# Patient Record
Sex: MALE | Race: WHITE | NOT HISPANIC OR LATINO | Employment: FULL TIME | URBAN - METROPOLITAN AREA
[De-identification: names, ages, dates, MRNs, and addresses within clinical notes are randomized per-mention and may not be internally consistent; named-entity substitution may affect disease eponyms.]

---

## 2018-03-04 ENCOUNTER — HOSPITAL ENCOUNTER (EMERGENCY)
Facility: CLINIC | Age: 43
Discharge: HOME | End: 2018-03-04
Attending: PHYSICIAN ASSISTANT | Admitting: PHYSICIAN ASSISTANT
Payer: OTHER MISCELLANEOUS

## 2018-03-04 ENCOUNTER — APPOINTMENT (OUTPATIENT)
Dept: CT IMAGING | Facility: CLINIC | Age: 43
End: 2018-03-04
Attending: PHYSICIAN ASSISTANT
Payer: OTHER MISCELLANEOUS

## 2018-03-04 VITALS
TEMPERATURE: 97 F | DIASTOLIC BLOOD PRESSURE: 74 MMHG | SYSTOLIC BLOOD PRESSURE: 109 MMHG | HEART RATE: 87 BPM | RESPIRATION RATE: 20 BRPM | OXYGEN SATURATION: 99 %

## 2018-03-04 DIAGNOSIS — S39.012A STRAIN OF LUMBAR REGION, INITIAL ENCOUNTER: ICD-10-CM
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
## 2018-03-04 PROCEDURE — 99284 EMERGENCY DEPT VISIT MOD MDM: CPT | Mod: 25 | Performed by: PHYSICIAN ASSISTANT

## 2018-03-04 PROCEDURE — 72131 CT LUMBAR SPINE W/O DYE: CPT

## 2018-03-04 PROCEDURE — 99284 EMERGENCY DEPT VISIT MOD MDM: CPT | Mod: Z6 | Performed by: PHYSICIAN ASSISTANT

## 2018-03-04 RX ORDER — CYCLOBENZAPRINE HCL 10 MG
10 TABLET ORAL 3 TIMES DAILY PRN
Qty: 20 TABLET | Refills: 0 | Status: SHIPPED | OUTPATIENT
Start: 2018-03-04 | End: 2018-03-10

## 2018-03-04 RX ORDER — METHYLPREDNISOLONE 4 MG
TABLET, DOSE PACK ORAL
Qty: 21 TABLET | Refills: 0 | Status: SHIPPED | OUTPATIENT
Start: 2018-03-04 | End: 2020-02-13

## 2018-03-04 ASSESSMENT — ENCOUNTER SYMPTOMS
BACK PAIN: 1
NUMBNESS: 0

## 2018-03-04 NOTE — LETTER
March 4, 2018      Navdeep Sands  [address] [address]        To Whom It May Concern:    Navdeep Sands  was seen on 3/4/2018.  Please excuse him from work for the next few days due to injury. He can return when feeling improved, but allow him to work at his own pace.        Sincerely,        Sandra Thorne PA-C

## 2018-03-04 NOTE — ED AVS SNAPSHOT
[facility]    [address]     [place] [address]    Phone:  [phone]    Fax:  [phone]                                       Navdeep Sands   MRN: [id number]    Department:  [facility]   Date of Visit:  3/4/2018           Patient Information     Date Of Birth          1975        Your diagnoses for this visit were:     Strain of lumbar region, initial encounter     Fall, initial encounter        You were seen by Sandra Thorne PA-C.      Follow-up Information     Follow up with [facility].    Specialty:  EMERGENCY MEDICINE    Why:  If symptoms worsen    Contact information:    [address]   [place] [address] [phone]    Additional information:    From [address] Exit at [address] on [place] side of [place]. Turn right on [address]. Turn left at stoplight on [address]. [facility] will be in view two blocks ahead        Follow up with Patricia Domingo PA-C In 5 days.    Specialty:  Physician Assistant    Why:  As needed for persistent symptoms    Contact information:    [address] [address]  [phone]          Discharge Instructions       Take the steroids as prescribed to treat your back pain. These can make you feel a bit jittery at times. Use the Flexeril as well as Tylenol for symptomatic relief. If you develop worsening symptoms as discussed please return to the emergency department.  If symptoms are not improved in 5 days, follow-up in the clinic with your primary care provider.    Thank you for choosing [facility]. It was a pleasure taking care of you today. If you have any questions, please call [phone].    Sandra Thorne PA-C      Back Sprain or Strain    Injury to the muscles (strain) or ligaments (sprain) around the spine can be troubling. Injury may occur after a sudden forceful  twisting or bending force such as in a car accident, after a simple awkward movement, or after lifting something heavy with poor body positioning. In any case, muscle spasm is often present and adds to the pain.  Thankfully, most people feel better in 1 to 2 weeks, and most of the rest in 1 to 2 months. Most people can remain active. Unless you had a forceful or traumatic physical injury such as a car accident or fall, X-rays may not be ordered for the first evaluation of a back sprain or strain. If pain continues and does not respond to medical treatment, your healthcare provider may then order X-rays and other tests.  Home care  The following guidelines will help you care for your injury at home:    When in bed, try to find a comfortable position. A firm mattress is best. Try lying flat on your back with pillows under your knees. You can also try lying on your side with your knees bent up toward your chest and a pillow between your knees.    Don't sit for long periods. Try not to take long car rides or take other trips that have you sitting for a long time. This puts more stress on the lower back than standing or walking.    During the first 24 to 72 hours after an injury or flare-up, apply an ice pack to the painful area for 20 minutes. Then remove it for 20 minutes. Do this for 60 to 90 minutes, or several times a day. This will reduce swelling and pain. Be sure to wrap the ice pack in a thin towel or plastic to protect your skin.    You can start with ice, then switch to heat. Heat from a hot shower, hot bath, or heating pad reduces pain and works well for muscle spasms. Put heat on the painful area for 20 minutes, then remove for 20 minutes. Do this for 60 to 90 minutes, or several times a day. Do not use a heating pad while sleeping. It can burn the skin.    You can alternate the ice and heat. Talk with your healthcare provider to find out the best treatment or therapy for your back pain.    Therapeutic  massage will help relax the back muscles without stretching them.    Be aware of safe lifting methods. Do not lift anything over 15 pounds until all of the pain is gone.  Medicines  Talk to your healthcare provider before using medicines, especially if you have other health problems or are taking other medicines.    You may use acetaminophen or ibuprofen to control pain, unless another pain medicine was prescribed. If you have chronic conditions like diabetes, liver or kidney disease, stomach ulcers, or gastrointestinal bleeding, or are taking blood-thinner medicines, talk with your doctor before taking any medicines.    Be careful if you are given prescription medicines, narcotics, or medicine for muscle spasm. They can cause drowsiness, and affect your coordination, reflexes, and judgment. Do not drive or operate heavy machinery when taking these types of medicines. Only take pain medicine as prescribed by your healthcare provider.  Follow-up care  Follow up with your healthcare provider, or as advised. You may need physical therapy or more tests if your symptoms get worse.  If you had X-rays your healthcare provider may be checking for any broken bones, breaks, or fractures. Bruises and sprains can sometimes hurt as much as a fracture. These injuries can take time to heal completely. If your symptoms don t improve or they get worse, talk with your healthcare provider. You may need a repeat X-ray or other tests.  Call 911  Call for emergency care if any of the following occur:    Trouble breathing    Confused    Very drowsy or trouble awakening    Fainting or loss of consciousness    Rapid or very slow heart rate    Loss of bowel or bladder control  When to seek medical advice  Call your healthcare provider right away if any of the following occur:    Pain gets worse or spreads to your arms or legs    Weakness or numbness in one or both arms or legs    Numbness in the groin or genital area        24 Hour  Appointment Hotline       To make an appointment at any [facility], call [phone] ([phone]). If you don't have a family doctor or clinic, we will help you find one. [facility] are conveniently located to serve the needs of you and your family.             Review of your medicines      START taking        Dose / Directions Last dose taken    cyclobenzaprine 10 MG tablet   Commonly known as:  FLEXERIL   Dose:  10 mg   Quantity:  20 tablet        Take 1 tablet (10 mg) by mouth 3 times daily as needed for muscle spasms   Refills:  0        methylPREDNISolone 4 MG tablet   Commonly known as:  MEDROL DOSEPAK   Quantity:  21 tablet        Follow package instructions   Refills:  0          Our records show that you are taking the medicines listed below. If these are incorrect, please call your family doctor or clinic.        Dose / Directions Last dose taken    ibuprofen 800 MG tablet   Commonly known as:  ADVIL/MOTRIN   Dose:  800 mg   Quantity:  60 tablet        Take 1 tablet (800 mg) by mouth every 8 hours as needed for moderate pain   Refills:  0                Prescriptions were sent or printed at these locations (2 Prescriptions)                   [facility]   [address] [place] [address]    Telephone:  [phone]   Fax:  [phone]   Hours:                  Printed at Department/Unit printer (2 of 2)         methylPREDNISolone (MEDROL DOSEPAK) 4 MG tablet               cyclobenzaprine (FLEXERIL) 10 MG tablet                Procedures and tests performed during your visit     CT Lumbar spine w/o contrast*      Orders Needing Specimen Collection     None      Pending Results     No orders found from 3/2/2018 to 3/5/2018.            Pending Culture Results     No orders found from 3/2/2018 to 3/5/2018.            Pending Results Instructions     If you had any lab results that were not finalized at the time of your Discharge, you can call the ED Lab Result RN at  [phone]. You will be contacted by this team for any positive Lab results or changes in treatment. The nurses are available 7 days a week from 10A to 6:30P.  You can leave a message 24 hours per day and they will return your call.        Thank you for choosing [facility]       Thank you for choosing [facility] for your care. Our goal is always to provide you with excellent care. Hearing back from our patients is one way we can continue to improve our services. Please take a few minutes to complete the written survey that you may receive in the mail after you visit with us. Thank you!        [employer] Information     [facility] gives you secure access to your electronic health record. If you see a primary care provider, you can also send messages to your care team and make appointments. If you have questions, please call your primary care clinic.  If you do not have a primary care provider, please call [phone] and they will assist you.        Care EveryWhere ID     This is your Care EveryWhere ID. This could be used by other organizations to access your [facility] medical records  [id number]        Equal Access to Services     EMMA ROCHE : Hadmirna Mcknight, wamagda blair, qaemma sanders, calli lomeli . So [facility] [phone].    ATENCIÓN: Si habla español, tiene a szymanski disposición servicios gratuitos de asistencia lingüística. Llame al [phone].    We comply with applicable federal civil rights laws and [place] laws. We do not discriminate on the basis of race, color, national origin, age, disability, sex, sexual orientation, or gender identity.            After Visit Summary       This is your record. Keep this with you and show to your community pharmacist(s) and doctor(s) at your next visit.

## 2018-03-04 NOTE — ED AVS SNAPSHOT
[facility] Emergency Department    [address] [place] [address]    Phone:  [phone]    Fax:  [phone]                                       Navdeep Sands   MRN: [id number]    Department:  [facility] Emergency Department   Date of Visit:  3/4/2018           After Visit Summary Signature Page     I have received my discharge instructions, and my questions have been answered. I have discussed any challenges I see with this plan with the nurse or doctor.    ..........................................................................................................................................  Patient/Patient Representative Signature      ..........................................................................................................................................  Patient Representative Print Name and Relationship to Patient    ..................................................               ................................................  Date                                            Time    ..........................................................................................................................................  Reviewed by Signature/Title    ...................................................              ..............................................  Date                                                            Time

## 2018-03-05 ASSESSMENT — ENCOUNTER SYMPTOMS
FEVER: 0
WOUND: 0
VOMITING: 0
NAUSEA: 0
WEAKNESS: 0

## 2018-03-05 NOTE — ED PROVIDER NOTES
History     Chief Complaint   Patient presents with     Back Pain     HPI  Navdeep Sands is a 42 year old male who presents to the ED for evaluation of back pain. Patient was walking down the steps 5 days ago and slipped on the last step causing him to land on his back. He had his wife look at him and she noted that his right knee and right lower back were bruised. He has been using Flexeril and applying ice and heat with mild relief. He has tried looking up different exercises online to help with the pain. He also took 600 mg of Ibuprofen. He mentions that when he was stretching he felt the pain got much worse. He thinks that he over exerted himself. He denies numbness or tingling down the legs. No bowel or bladder incontinence.  Denies any other injuries.  Main concern is back, knee is not bothering him.        Problem List:    Patient Active Problem List    Diagnosis Date Noted     Obesity, Class I, BMI 30-34.9 10/22/2015     Priority: Medium     CARDIOVASCULAR SCREENING; LDL GOAL LESS THAN 160 03/11/2014     Priority: Medium        Past Medical History:    No past medical history on file.    Past Surgical History:    No past surgical history on file.    Family History:    Family History   Problem Relation Age of Onset     CANCER Mother      liver cancer and skin cancer     Hypertension Maternal Grandfather      Asthma No family hx of      C.A.D. No family hx of      DIABETES No family hx of      CEREBROVASCULAR DISEASE No family hx of      Breast Cancer No family hx of      Cancer - colorectal No family hx of      Hypertension Maternal Uncle        Social History:  Marital Status:  Single [1]  Social History   Substance Use Topics     Smoking status: Current Every Day Smoker     Packs/day: 0.50     Years: 22.00     Types: Cigarettes     Smokeless tobacco: Never Used     Alcohol use No        Medications:      methylPREDNISolone (MEDROL DOSEPAK) 4 MG tablet   cyclobenzaprine (FLEXERIL) 10 MG tablet   ibuprofen  (ADVIL,MOTRIN) 800 MG tablet       Review of Systems   Constitutional: Negative for fever.   Gastrointestinal: Negative for nausea and vomiting.   Musculoskeletal: Positive for back pain.   Skin: Negative for wound.   Neurological: Negative for weakness and numbness.   All other systems reviewed and are negative.    Physical Exam   BP: 121/80  Pulse: 80  Temp: 97  F (36.1  C)  Resp: 20  SpO2: 99 %    Physical Exam   Constitutional: He is oriented to person, place, and time. He appears well-developed and well-nourished. No distress.   HENT:   Head: Normocephalic and atraumatic.   Mouth/Throat: Uvula is midline, oropharynx is clear and moist and mucous membranes are normal. No oral lesions. No oropharyngeal exudate, posterior oropharyngeal edema or posterior oropharyngeal erythema.   Eyes: Conjunctivae are normal. No scleral icterus.   Neck: Normal range of motion. Neck supple.   Cardiovascular: Intact distal pulses.    Pulmonary/Chest: Effort normal. No respiratory distress.   Musculoskeletal: He exhibits no edema.        Cervical back: Normal.        Thoracic back: Normal.   Mid low lumbar midline tenderness, right paraspinal spasms and tenderness to palpation  Bilateral lower extremities with normal strength, normal sensation, and strong distal pulses.   Lymphadenopathy:     He has no cervical adenopathy.   Neurological: He is alert and oriented to person, place, and time.   Skin: Skin is warm and dry. He is not diaphoretic. No pallor.   Psychiatric: He has a normal mood and affect. His behavior is normal.   Nursing note and vitals reviewed.    ED Course     ED Course     Procedures      Results for orders placed or performed during the hospital encounter of 03/04/18 (from the past 24 hour(s))   CT Lumbar spine w/o contrast*    Narrative    CT LUMBAR SPINE WITHOUT CONTRAST  3/4/2018 9:37 PM     HISTORY: Pain in low back after fall.       TECHNIQUE: Axial images of the lumbar spine were obtained without  intravenous  contrast. Multiplanar reformations were performed.   Radiation dose for this scan was reduced using automated exposure  control, adjustment of the mA and/or kV according to patient size, or  iterative reconstruction technique.    COMPARISON: None.    FINDINGS:  There are 5 lumbar-type vertebral bodies assumed for the  purposes of this dictation.     Lumbar spine alignment is within normal limits. No loss of vertebral  body height. No evidence of fracture. There is mild loss of  intervertebral disc space and degenerative endplate changes at L3-4.    Level by level as follows:    T12-L1:  No significant spinal canal or neural foraminal narrowing.     L1-L2:  No significant spinal canal or neural foraminal narrowing.     L2-L3:  No significant spinal canal or neural foraminal narrowing.     L3-L4:  Small posterior disc bulge and bilateral facet hypertrophy  results in mild spinal canal narrowing. No significant neural  foraminal narrowing.     L4-L5:  Small posterior disc bulge along with bilateral facet  hypertrophy results in mild spinal canal narrowing. No significant  neural foraminal narrowing.     L5-S1:  Small posterior disc bulge without significant spinal canal or  neural foraminal narrowing.     Visualized paraspinous tissues: Unremarkable.      Impression    IMPRESSION:  No acute fracture or subluxation in the lumbar spine.  Degenerative changes as described above.       ANGELA FIELDS MD       Medications - No data to display    Assessments & Plan (with Medical Decision Making)  Navdeep Sands is a 42 year old male who presented to the ED with back pain that developed several days ago after he slipped and fell onto his back on ice.  He reports pain to the mid low back into the right hip.  Vitals on arrival unremarkable.  He exhibited low midline lumbar tenderness and right paraspinal muscle tenderness with spasm.  He had full strength in bilateral lower extremities, no numbness, and no loss of bowel or  bladder control.  Patient was offered pain medications here but declined.  I have low suspicion for acute neurological injury at this time given these findings, so no MRI indicated.  There was concern for fracture of the lumbar back so CT scan obtained.  This was fortunately negative for acute fracture or subluxation in the lumbar spine.  Patient likely contused the back and sustained a mild strain.  He was agreeable to trying a Medrol Dosepak and continued use of Flexeril for symptomatic relief, I did go over side effects of both medications with him.  I advised no ibuprofen while taking steroids but he can take Tylenol.  He was advised to continue icing the low back and practice gentle stretching.  I did go over warning signs and symptoms of when to return to the ED, otherwise he should follow-up with his PCP in 5-7 days if no improvement in symptoms.  Patient in agreement with plan and discharged home.     I have reviewed the nursing notes.    I have reviewed the findings, diagnosis, plan and need for follow up with the patient.    Discharge Medication List as of 3/4/2018 10:19 PM      START taking these medications    Details   methylPREDNISolone (MEDROL DOSEPAK) 4 MG tablet Follow package instructions, Disp-21 tablet, R-0, Local Print      cyclobenzaprine (FLEXERIL) 10 MG tablet Take 1 tablet (10 mg) by mouth 3 times daily as needed for muscle spasms, Disp-20 tablet, R-0, Local Print             Final diagnoses:   Strain of lumbar region, initial encounter   Fall, initial encounter     This document serves as a record of services personally performed by Sandra Thorne PA-C. It was created on their behalf by Tamela Madera, a trained medical scribe. The creation of this record is based on the provider's personal observations and the statements of the patient. This document has been checked and approved by the attending provider.    Note: Chart documentation done in part with Dragon Voice Recognition  software. Although reviewed after completion, some word and grammatical errors may remain.      3/4/2018   [facility] EMERGENCY DEPARTMENT     Sandra Thorne PA-C  03/05/18 0005

## 2018-03-05 NOTE — DISCHARGE INSTRUCTIONS
Take the steroids as prescribed to treat your back pain. These can make you feel a bit jittery at times. Use the Flexeril as well as Tylenol for symptomatic relief. If you develop worsening symptoms as discussed please return to the emergency department.  If symptoms are not improved in 5 days, follow-up in the clinic with your primary care provider.    Thank you for choosing [facility] Emergency Department. It was a pleasure taking care of you today. If you have any questions, please call [phone].    Sandra Thorne PA-C      Back Sprain or Strain    Injury to the muscles (strain) or ligaments (sprain) around the spine can be troubling. Injury may occur after a sudden forceful twisting or bending force such as in a car accident, after a simple awkward movement, or after lifting something heavy with poor body positioning. In any case, muscle spasm is often present and adds to the pain.  Thankfully, most people feel better in 1 to 2 weeks, and most of the rest in 1 to 2 months. Most people can remain active. Unless you had a forceful or traumatic physical injury such as a car accident or fall, X-rays may not be ordered for the first evaluation of a back sprain or strain. If pain continues and does not respond to medical treatment, your healthcare provider may then order X-rays and other tests.  Home care  The following guidelines will help you care for your injury at home:    When in bed, try to find a comfortable position. A firm mattress is best. Try lying flat on your back with pillows under your knees. You can also try lying on your side with your knees bent up toward your chest and a pillow between your knees.    Don't sit for long periods. Try not to take long car rides or take other trips that have you sitting for a long time. This puts more stress on the lower back than standing or walking.    During the first 24 to 72 hours after an injury or flare-up, apply an ice pack to the painful area  for 20 minutes. Then remove it for 20 minutes. Do this for 60 to 90 minutes, or several times a day. This will reduce swelling and pain. Be sure to wrap the ice pack in a thin towel or plastic to protect your skin.    You can start with ice, then switch to heat. Heat from a hot shower, hot bath, or heating pad reduces pain and works well for muscle spasms. Put heat on the painful area for 20 minutes, then remove for 20 minutes. Do this for 60 to 90 minutes, or several times a day. Do not use a heating pad while sleeping. It can burn the skin.    You can alternate the ice and heat. Talk with your healthcare provider to find out the best treatment or therapy for your back pain.    Therapeutic massage will help relax the back muscles without stretching them.    Be aware of safe lifting methods. Do not lift anything over 15 pounds until all of the pain is gone.  Medicines  Talk to your healthcare provider before using medicines, especially if you have other health problems or are taking other medicines.    You may use acetaminophen or ibuprofen to control pain, unless another pain medicine was prescribed. If you have chronic conditions like diabetes, liver or kidney disease, stomach ulcers, or gastrointestinal bleeding, or are taking blood-thinner medicines, talk with your doctor before taking any medicines.    Be careful if you are given prescription medicines, narcotics, or medicine for muscle spasm. They can cause drowsiness, and affect your coordination, reflexes, and judgment. Do not drive or operate heavy machinery when taking these types of medicines. Only take pain medicine as prescribed by your healthcare provider.  Follow-up care  Follow up with your healthcare provider, or as advised. You may need physical therapy or more tests if your symptoms get worse.  If you had X-rays your healthcare provider may be checking for any broken bones, breaks, or fractures. Bruises and sprains can sometimes hurt as much as a  fracture. These injuries can take time to heal completely. If your symptoms don t improve or they get worse, talk with your healthcare provider. You may need a repeat X-ray or other tests.  Call 911  Call for emergency care if any of the following occur:    Trouble breathing    Confused    Very drowsy or trouble awakening    Fainting or loss of consciousness    Rapid or very slow heart rate    Loss of bowel or bladder control  When to seek medical advice  Call your healthcare provider right away if any of the following occur:    Pain gets worse or spreads to your arms or legs    Weakness or numbness in one or both arms or legs    Numbness in the groin or genital area

## 2020-02-04 ENCOUNTER — OFFICE VISIT (OUTPATIENT)
Dept: DERMATOLOGY | Facility: CLINIC | Age: 45
End: 2020-02-04
Payer: MEDICAID

## 2020-02-04 ENCOUNTER — TELEPHONE (OUTPATIENT)
Dept: DERMATOLOGY | Facility: CLINIC | Age: 45
End: 2020-02-04

## 2020-02-04 DIAGNOSIS — R21 RASH: ICD-10-CM

## 2020-02-04 DIAGNOSIS — D48.5 NEOPLASM OF UNCERTAIN BEHAVIOR OF SKIN: Primary | ICD-10-CM

## 2020-02-04 PROCEDURE — 88305 TISSUE EXAM BY PATHOLOGIST: CPT | Mod: TC | Performed by: DERMATOLOGY

## 2020-02-04 PROCEDURE — 99213 OFFICE O/P EST LOW 20 MIN: CPT | Mod: 25 | Performed by: DERMATOLOGY

## 2020-02-04 PROCEDURE — 11102 TANGNTL BX SKIN SINGLE LES: CPT | Performed by: DERMATOLOGY

## 2020-02-04 RX ORDER — PERMETHRIN 50 MG/G
CREAM TOPICAL
Qty: 120 G | Refills: 1 | Status: SHIPPED | OUTPATIENT
Start: 2020-02-04 | End: 2020-02-13

## 2020-02-04 RX ORDER — TRIAMCINOLONE ACETONIDE 1 MG/G
CREAM TOPICAL
Qty: 454 G | Refills: 0 | Status: SHIPPED | OUTPATIENT
Start: 2020-02-04 | End: 2020-02-13

## 2020-02-04 ASSESSMENT — PAIN SCALES - GENERAL: PAINLEVEL: NO PAIN (0)

## 2020-02-04 NOTE — PATIENT INSTRUCTIONS
*if you have any concerns questions please call us at [phone]*      Wound Care After a Biopsy    What is a skin biopsy?  A skin biopsy allows the doctor to examine a very small piece of tissue under the microscope to determine the diagnosis and the best treatment for the skin condition. A local anesthetic (numbing medicine)  is injected with a very small needle into the skin area to be tested. A small piece of skin is taken from the area. Sometimes a suture (stitch) is used.     What are the risks of a skin biopsy?  I will experience scar, bleeding, swelling, pain, crusting and redness. I may experience incomplete removal or recurrence. Risks of this procedure are excessive bleeding, bruising, infection, nerve damage, numbness, thick (hypertrophic or keloidal) scar and non-diagnostic biopsy.    How should I care for my wound for the first 24 hours?    Keep the wound dry and covered for 24 hours    If it bleeds, hold direct pressure on the area for 15 minutes. If bleeding does not stop then go to the emergency room    Avoid strenuous exercise the first 1-2 days or as your doctor instructs you    How should I care for the wound after 24 hours?    After 24 hours, remove the bandage    You may bathe or shower as normal    If you had a scalp biopsy, you can shampoo as usual and can use shower water to clean the biopsy site daily    Clean the wound twice a day with gentle soap and water    Do not scrub, be gentle    Apply white petroleum/Vaseline after cleaning the wound with a cotton swab or a clean finger, and keep the site covered with a Bandaid /bandage. Bandages are not necessary with a scalp biopsy    If you are unable to cover the site with a Bandaid /bandage, re-apply ointment 2-3 times a day to keep the site moist. Moisture will help with healing    Avoid strenuous activity for first 1-2 days    Avoid lakes, rivers, pools, and oceans until the stitches are removed or the site is healed    How do I clean my  wound?    Wash hands thoroughly with soap or use hand  before all wound care    Clean the wound with gentle soap and water    Apply white petroleum/Vaseline  to wound after it is clean    Replace the Bandaid /bandage to keep the wound covered for the first few days or as instructed by your doctor    If you had a scalp biopsy, warm shower water to the area on a daily basis should suffice    What should I use to clean my wound?     Cotton-tipped applicators (Qtips )    White petroleum jelly (Vaseline ). Use a clean new container and use Q-tips to apply.    Bandaids   as needed    Gentle soap     How should I care for my wound long term?    Do not get your wound dirty    Keep up with wound care for one week or until the area is healed.    A small scab will form and fall off by itself when the area is completely healed. The area will be red and will become pink in color as it heals. Sun protection is very important for how your scar will turn out. Sunscreen with an SPF 30 or greater is recommended once the area is healed.    You should have some soreness but it should be mild and slowly go away over several days. Talk to your doctor about using tylenol for pain,    When should I call my doctor?  If you have increased:     Pain or swelling    Pus or drainage (clear or slightly yellow drainage is ok)    Temperature over 100F    Spreading redness or warmth around wound    When will I hear about my results?  The biopsy results can take 2-3 weeks to come back. The clinic will call you with the results, send you a [employer] message, or have you schedule a follow-up clinic or phone time to discuss the results. Contact our clinics if you do not hear from us in 3 weeks.     Who should I call with questions?    [facility]: [phone]     [facility]: [phone]    For urgent needs outside of business hours call the [facility] at [phone] and ask  for the dermatology resident on call      [email or web address]  [email or web address]

## 2020-02-04 NOTE — LETTER
2/4/2020         RE: Navdeep Sands  [address] [id number]        Dear Colleague,    Thank you for referring your patient, Navdeep Sands, to the [facility]. Please see a copy of my visit note below.    [facility] Dermatology Note      Dermatology Problem List:  1. Erythematous papules and plaques- likely scabies  -premethrin, triamcinolone, vanicream    2.  NUB, right lateral elbow 6 mm verrucous papule,  -s/p shave biopsy 02/04/20    3. 2 cm subcutaneous nodule beneath left cheek    Encounter Date: Feb 4, 2020    CC:  Chief Complaint   Patient presents with     Rash     all over- itchy has used medrol dose rodolfo completed helped but once stopped all came back         History of Present Illness:  Mr. Navdeep Sands is a 44 year old male who presents as a self-referral for a rash. He has not been seen by dermatology before. Today he reports the rash started 10 days ago. He then used a Medrol Dose Pack, which helped, but once he stopped the rash returned. It currently itches. He reports it is all over his body. He has not tried topical medications. Does not feel sick. Changed laundry soap and new cologne. No one at home has rash - has two adult kids that live with him. Friend with scabies stayed at patient's home for 1 night three months ago.     Patient also reports spot of concern on left cheek. Has been present for 2 years. Patient smokes cigarettes. No pain.    Patient has hip surgery 1 week from tomorrow.     Pt has wart like spot on arm he wants removed.     No other concerns.    Past Medical History:   Patient Active Problem List   Diagnosis     CARDIOVASCULAR SCREENING; LDL GOAL LESS THAN 160     Obesity, Class I, BMI 30-34.9     No past medical history on file.  No past surgical history on file.    Social History:  Patient reports that he has been smoking cigarettes. He has a 11.00 pack-year smoking history. He has never used smokeless tobacco. He  reports that he does not drink alcohol or use drugs. Not . Has two adult children that live with him. Has girlfriend.     Family History:  Family History   Problem Relation Age of Onset     Cancer Mother         liver cancer and skin cancer     Hypertension Maternal Grandfather      Asthma No family hx of      C.A.D. No family hx of      Diabetes No family hx of      Cerebrovascular Disease No family hx of      Breast Cancer No family hx of      Cancer - colorectal No family hx of      Hypertension Maternal Uncle        Medications:  Current Outpatient Medications   Medication Sig Dispense Refill     ibuprofen (ADVIL,MOTRIN) 800 MG tablet Take 1 tablet (800 mg) by mouth every 8 hours as needed for moderate pain 60 tablet 0     methylPREDNISolone (MEDROL DOSEPAK) 4 MG tablet Follow package instructions (Patient not taking: Reported on 2/4/2020) 21 tablet 0       Allergies   Allergen Reactions     Nickel Other (See Comments)     convulsions     Imitrex [Sumatriptan]      Eye pain, stomach pain, headache       Review of Systems:  -Constitutional: Otherwise feeling well today, in usual state of health.  -HEENT: Patient denies nonhealing oral sores.  -Skin: As above in HPI. No additional skin concerns.    Physical exam:  Vitals: There were no vitals taken for this visit.  GEN: This is a well developed, well-nourished male in no acute distress, in a pleasant mood.    SKIN: Full skin, which includes the head/face, both arms, chest, back, abdomen,both legs, genitalia and/or groin buttocks, digits and/or nails, was examined.  -palms clear  -Erythematous papules and plaques on trunk/extremeties, groin, 1 lesion on wrist  - 6 mm verrucous papule, right lateral elbow  -2 cm subcutaneous nodule beneath left cheek  -No other lesions of concern on areas examined.       Impression/Plan:  1. NUB, right lateral elbow 6 mm verrucous papule, Ddx: wart vs other  -Shave biopsy:  After discussion of benefits and risks including but  not limited to bleeding/bruising, pain/swelling, infection, scar, incomplete removal, nerve damage/numbness, recurrence, and non-diagnostic biopsy, written consent, verbal consent and photographs were obtained. Time-out was performed. The area was cleaned with isopropyl alcohol. 0.5mL of 1% lidocaine with epinephrine was injected to obtain adequate anesthesia of the lesion on the right lateral elbow. A shave biopsy was performed. Hemostasis was achieved with aluminium chloride. Vaseline and a sterile dressing were applied. The patient tolerated the procedure and no complications were noted. The patient was provided with verbal and written post care instructions.       2. Erythematous papules and plaques on trunk/extremeties, groin, 1 lesion on wrist - likely scabies due to exposure and clinical appearance. Will treat and if not improved then biopsy.   -Mineral oil scarping completed. Evidence of mite not visualized after 2 attempts.  -The patient was counseled on the infectious and contagious nature of this disease.Start permethrin 5% cream to whole body from neck down (including under fingernails, genitals, feet and hands) overnight, wash off in morning, repeat in 1 week. All close contacts should also see their physician for evaluation and treatment.  Wash clothes, bedding in hot water after treatments.Wash all clothing and bed linens used in last 72 hours in hot soapy water.  Anything that cannot be washed should be placed in a plastic bag and not worn for 72 hours. Nursing was notified so as to conform to hospital protocol. Between treatments, start emollient twice daily from the neck down.   When not using permethrin, may use triamcinolone 0.1% cream twice daily for 2 weeks on the trunk, arms and legs. Discussed this is not for face/groin/axilla or folds.Discussed risk of steroid atrophy with overuse. I counseled that the rash and itch will not resolve immediately and that sometimes the rash persists for weeks  after treatment.     3. 2 cm subcutaneous nodule beneath left cheek - likely cyst, order ultrasound to rule out other tumor. Pt is smoker.   -Ultrasound ordered.     Follow-up in 4 weeks after hip surgery for rash follow up, earlier for new or changing lesions.       Staff Involved:  Scribe/Staff      Scribe Disclosure  I, Travis Joseph, am serving as a scribe to document services personally performed by Dr. Brigid Pierce MD, based on data collection and the provider's statements to me.    Provider Disclosure:   The documentation recorded by the scribe accurately reflects the services I personally performed and the decisions made by me.    Brigid Pierce MD    Department of Dermatology  [facility]: Phone: [phone], Fax:[phone]  [facility]: Phone: [phone], Fax: [phone]              Again, thank you for allowing me to participate in the care of your patient.        Sincerely,        Brigid Pierce MD

## 2020-02-04 NOTE — NURSING NOTE
Navdeep Sands's goals for this visit include:   Chief Complaint   Patient presents with     Rash     all over- itchy has used medrol dose rodolfo completed helped but once stopped all came back       He requests these members of his care team be copied on today's visit information:     PCP: Patricia Domingo    Referring Provider:  No referring provider defined for this encounter.    There were no vitals taken for this visit.    Do you need any medication refills at today's visit? Eboni Fung LPN

## 2020-02-04 NOTE — PROGRESS NOTES
[facility] Dermatology Note      Dermatology Problem List:  1. Erythematous papules and plaques- likely scabies  -premethrin, triamcinolone, vanicream    2.  NUB, right lateral elbow 6 mm verrucous papule,  -s/p shave biopsy 02/04/20    3. 2 cm subcutaneous nodule beneath left cheek    Encounter Date: Feb 4, 2020    CC:  Chief Complaint   Patient presents with     Rash     all over- itchy has used medrol dose rodolfo completed helped but once stopped all came back         History of Present Illness:  Mr. Navdeep Sands is a 44 year old male who presents as a self-referral for a rash. He has not been seen by dermatology before. Today he reports the rash started 10 days ago. He then used a Medrol Dose Pack, which helped, but once he stopped the rash returned. It currently itches. He reports it is all over his body. He has not tried topical medications. Does not feel sick. Changed laundry soap and new cologne. No one at home has rash - has two adult kids that live with him. Friend with scabies stayed at patient's home for 1 night three months ago.     Patient also reports spot of concern on left cheek. Has been present for 2 years. Patient smokes cigarettes. No pain.    Patient has hip surgery 1 week from tomorrow.     Pt has wart like spot on arm he wants removed.     No other concerns.    Past Medical History:   Patient Active Problem List   Diagnosis     CARDIOVASCULAR SCREENING; LDL GOAL LESS THAN 160     Obesity, Class I, BMI 30-34.9     No past medical history on file.  No past surgical history on file.    Social History:  Patient reports that he has been smoking cigarettes. He has a 11.00 pack-year smoking history. He has never used smokeless tobacco. He reports that he does not drink alcohol or use drugs. Not . Has two adult children that live with him. Has girlfriend.     Family History:  Family History   Problem Relation Age of Onset     Cancer Mother         liver cancer and skin cancer      Hypertension Maternal Grandfather      Asthma No family hx of      C.A.D. No family hx of      Diabetes No family hx of      Cerebrovascular Disease No family hx of      Breast Cancer No family hx of      Cancer - colorectal No family hx of      Hypertension Maternal Uncle        Medications:  Current Outpatient Medications   Medication Sig Dispense Refill     ibuprofen (ADVIL,MOTRIN) 800 MG tablet Take 1 tablet (800 mg) by mouth every 8 hours as needed for moderate pain 60 tablet 0     methylPREDNISolone (MEDROL DOSEPAK) 4 MG tablet Follow package instructions (Patient not taking: Reported on 2/4/2020) 21 tablet 0       Allergies   Allergen Reactions     Nickel Other (See Comments)     convulsions     Imitrex [Sumatriptan]      Eye pain, stomach pain, headache       Review of Systems:  -Constitutional: Otherwise feeling well today, in usual state of health.  -HEENT: Patient denies nonhealing oral sores.  -Skin: As above in HPI. No additional skin concerns.    Physical exam:  Vitals: There were no vitals taken for this visit.  GEN: This is a well developed, well-nourished male in no acute distress, in a pleasant mood.    SKIN: Full skin, which includes the head/face, both arms, chest, back, abdomen,both legs, genitalia and/or groin buttocks, digits and/or nails, was examined.  -palms clear  -Erythematous papules and plaques on trunk/extremeties, groin, 1 lesion on wrist  - 6 mm verrucous papule, right lateral elbow  -2 cm subcutaneous nodule beneath left cheek  -No other lesions of concern on areas examined.       Impression/Plan:  1. NUB, right lateral elbow 6 mm verrucous papule, Ddx: wart vs other  -Shave biopsy:  After discussion of benefits and risks including but not limited to bleeding/bruising, pain/swelling, infection, scar, incomplete removal, nerve damage/numbness, recurrence, and non-diagnostic biopsy, written consent, verbal consent and photographs were obtained. Time-out was performed. The area was  cleaned with isopropyl alcohol. 0.5mL of 1% lidocaine with epinephrine was injected to obtain adequate anesthesia of the lesion on the right lateral elbow. A shave biopsy was performed. Hemostasis was achieved with aluminium chloride. Vaseline and a sterile dressing were applied. The patient tolerated the procedure and no complications were noted. The patient was provided with verbal and written post care instructions.       2. Erythematous papules and plaques on trunk/extremeties, groin, 1 lesion on wrist - likely scabies due to exposure and clinical appearance. Will treat and if not improved then biopsy.   -Mineral oil scarping completed. Evidence of mite not visualized after 2 attempts.  -The patient was counseled on the infectious and contagious nature of this disease.Start permethrin 5% cream to whole body from neck down (including under fingernails, genitals, feet and hands) overnight, wash off in morning, repeat in 1 week. All close contacts should also see their physician for evaluation and treatment.  Wash clothes, bedding in hot water after treatments.Wash all clothing and bed linens used in last 72 hours in hot soapy water.  Anything that cannot be washed should be placed in a plastic bag and not worn for 72 hours. Nursing was notified so as to conform to hospital protocol. Between treatments, start emollient twice daily from the neck down.   When not using permethrin, may use triamcinolone 0.1% cream twice daily for 2 weeks on the trunk, arms and legs. Discussed this is not for face/groin/axilla or folds.Discussed risk of steroid atrophy with overuse. I counseled that the rash and itch will not resolve immediately and that sometimes the rash persists for weeks after treatment.     3. 2 cm subcutaneous nodule beneath left cheek - likely cyst, order ultrasound to rule out other tumor. Pt is smoker.   -Ultrasound ordered.     Follow-up in 4 weeks after hip surgery for rash follow up, earlier for new or  changing lesions.       Staff Involved:  Scribe/Staff      Scribe Disclosure  I, Travis Joseph, am serving as a scribe to document services personally performed by Dr. Brigid Pierce MD, based on data collection and the provider's statements to me.    Provider Disclosure:   The documentation recorded by the scribe accurately reflects the services I personally performed and the decisions made by me.    Brigid Pierce MD    Department of Dermatology  [facility]: Phone: [phone], Fax:[phone]  [facility]: Phone: [phone], Fax: [phone]

## 2020-02-04 NOTE — TELEPHONE ENCOUNTER
[facility] Call Center    Phone Message    May a detailed message be left on voicemail: yes     Reason for Call: Medication Question or concern regarding medication   Prescription Clarification  Name of Medication: triamcinolone (KENALOG) 0.1 % external cream [94685] (Order [id number])     Prescribing Provider: Kari   Pharmacy:      [facility] [place] [address]       What on the order needs clarification? They need to know where on the body it is being applied          Action Taken: Message routed to:  Adult Clinics: Dermatology p [phone]

## 2020-02-05 ENCOUNTER — HOSPITAL ENCOUNTER (OUTPATIENT)
Dept: ULTRASOUND IMAGING | Facility: CLINIC | Age: 45
Discharge: HOME | End: 2020-02-05
Attending: DERMATOLOGY | Admitting: DERMATOLOGY
Payer: MEDICAID

## 2020-02-05 DIAGNOSIS — D48.5 NEOPLASM OF UNCERTAIN BEHAVIOR OF SKIN: ICD-10-CM

## 2020-02-05 PROCEDURE — 76536 US EXAM OF HEAD AND NECK: CPT

## 2020-02-05 NOTE — TELEPHONE ENCOUNTER
Called pharmacy, this cma talked with alexey. Patient didn't  medication due to telling them it was a work comp. This cma stated she didn't have his appt down for work comp. Also clarified the tub of triam was for the neck down for the 2 weeks of using the cream.     Nayely Parsons CMA

## 2020-02-06 ENCOUNTER — TELEPHONE (OUTPATIENT)
Dept: DERMATOLOGY | Facility: CLINIC | Age: 45
End: 2020-02-06

## 2020-02-06 DIAGNOSIS — R22.9 SUBCUTANEOUS NODULE: Primary | ICD-10-CM

## 2020-02-06 NOTE — TELEPHONE ENCOUNTER
Notes recorded by Gina Bacon RN on 2/6/2020 at 4:18 PM CST  I spoke with Navdeep and notified of the result and recommendation.  He verbalized understanding and agreed with the plan.  He is having hip surgery tomorrow and would like to schedule ENT appt for end of March/early April.  We have our  call patient.  Gina Bacon RN    ------    Notes recorded by Brigid Pierce MD on 2/6/2020 at 3:36 PM CST  They said this is most likely a cyst o fthe left cheek but they cant be sure. However, have him see ENT. Have RN write a referral to otolaryngology for assessment within 2 months.

## 2020-02-07 LAB — COPATH REPORT: NORMAL

## 2020-02-08 NOTE — TELEPHONE ENCOUNTER
Called pharmacy for insurance information to start PA. Insurance given is for work comp and is through [employer] ID# [id number]. Per call to [facility] [phone], coverage for medication was denied 2/7/20 due to diagnosis not used for condition. Further review should go through work comp as Central prior auth team only submits claims through pharmacy benefits.

## 2020-02-11 ENCOUNTER — TELEPHONE (OUTPATIENT)
Dept: DERMATOLOGY | Facility: CLINIC | Age: 45
End: 2020-02-11

## 2020-02-11 NOTE — TELEPHONE ENCOUNTER
[facility] Call Center    Phone Message    May a detailed message be left on voicemail: yes     Reason for Call: Symptoms or Concerns     If patient has red-flag symptoms, warm transfer to triage line    Current symptom or concern: rash persisting and pt has surgery Wednesday and clearance is required. Pt is requesting a call back asap to discuss.      Action Taken: Message routed to:  Adult Clinics: Dermatology p [id number]    Travel Screening: Not Applicable

## 2020-02-11 NOTE — TELEPHONE ENCOUNTER
Only the surgeon can approve his own surgery.     IF the spots are flattened that means he is reposning. If he has new spots then we can offer him a biopsy ASAP .

## 2020-02-11 NOTE — TELEPHONE ENCOUNTER
[facility] Call Center    Phone Message    May a detailed message be left on voicemail: yes     Reason for Call: The patient is requesting a call to discuss a sooner visit.  Please advise. Thank you.     Action Taken: Message routed to:  Adult Clinics: Dermatology p [id number]    Travel Screening: Not Applicable

## 2020-02-11 NOTE — TELEPHONE ENCOUNTER
I am not aware of any work comp in relationship to this clinical visit. Please, call the patient and clarify. See how he is doing too

## 2020-02-11 NOTE — TELEPHONE ENCOUNTER
Talked with patient about rash.  He states that his surgeon told him that if he has any rashes, he will not be able to perform the surgery.  He wants approval from a different provider to be able to have his surgery.    He states that his rash has not gone away and he seems to continue to have additional spots come up.  I asked him if he followed the directions per the note from Dr. Pierce.  He stated that he followed everything to the 'T'.    He stated that he can see the swelling going down but its not going away.  I informed him that I will have to send this message over to Dr. Pierce and we will get back to him by the end of the day today.    Yesi Colin, CMA

## 2020-02-11 NOTE — TELEPHONE ENCOUNTER
Notes recorded by Yesi Colin CMA on 2/11/2020 at 8:07 AM CST  Mychart sent to patient with results.    Yesi Colin CMA    ------    Notes recorded by Brigid Pierce MD on 2/10/2020 at 9:24 PM CST  Most consistent with a wart like spot. Call us if this returns to be seen in clinic        Written by Yesi Colin CMA on 2/11/2020  8:07 AM   Navdeep,     We are writing to you to inform you of your results from your biopsy on 2/4/2020.  The lesion is most consistent with a wart like spot.  If the lesion returns, we can see you again in clinic for treatment.     Please let us know if you have any additional questions.     Thank you     Christy   [facility]

## 2020-02-12 NOTE — TELEPHONE ENCOUNTER
Left message for patient to return call to clinic. Per chart review, patient's surgery was cancelled for today.     Upon callback, plan is to ask about his rash, based on Dr. Pierce's note below.    Tamela Degroot RN, BSN  [facility]

## 2020-02-12 NOTE — TELEPHONE ENCOUNTER
Returned call to patient. Reports rash is getting worse and that he is having new spots. Per Dr. Pierce's note, recommended patient be seen. Scheduled for tomorrow, 2/13 at 11:30 AM. Patient is appreciative.    Tamela Degroot RN, BSN  [facility]

## 2020-02-13 ENCOUNTER — OFFICE VISIT (OUTPATIENT)
Dept: DERMATOLOGY | Facility: CLINIC | Age: 45
End: 2020-02-13
Payer: MEDICAID

## 2020-02-13 DIAGNOSIS — R21 RASH: ICD-10-CM

## 2020-02-13 PROCEDURE — 88341 IMHCHEM/IMCYTCHM EA ADD ANTB: CPT | Mod: TC | Performed by: DERMATOLOGY

## 2020-02-13 PROCEDURE — 88342 IMHCHEM/IMCYTCHM 1ST ANTB: CPT | Mod: TC | Performed by: DERMATOLOGY

## 2020-02-13 PROCEDURE — 11104 PUNCH BX SKIN SINGLE LESION: CPT | Performed by: DERMATOLOGY

## 2020-02-13 PROCEDURE — 88305 TISSUE EXAM BY PATHOLOGIST: CPT | Mod: TC | Performed by: DERMATOLOGY

## 2020-02-13 RX ORDER — TRIAMCINOLONE ACETONIDE 1 MG/G
CREAM TOPICAL
Qty: 454 G | Refills: 0 | Status: SHIPPED | OUTPATIENT
Start: 2020-02-13 | End: 2022-11-03

## 2020-02-13 ASSESSMENT — PAIN SCALES - GENERAL: PAINLEVEL: NO PAIN (0)

## 2020-02-13 NOTE — PATIENT INSTRUCTIONS

## 2020-02-13 NOTE — LETTER
2/13/2020         RE: Navdeep Sands  [id number] [address]        Dear Colleague,    Thank you for referring your patient, Navdeep Sands, to the [facility]. Please see a copy of my visit note below.    [facility] Dermatology Note      Dermatology Problem List:  1. Erythematous papules and plaques- likely scabies  -premethrin, triamcinolone, vanicream not helpful    2.  Verrucous keratosis, right lateral elbow  -s/p shave biopsy 02/04/20    3. 2 cm subcutaneous nodule beneath left cheek    Encounter Date: Feb 13, 2020    CC:  Chief Complaint   Patient presents with     RECHECK     rash- started to clear up and then it reappeared again          History of Present Illness:  Mr. Navdeep Sands is a 44 year old male who presents as a follow up for rash. Last seen on 2/4/2020 when he was treated for scabies.     Today he states he was following all prescribed meds and did not see much benefit.     No other concerns.    Past Medical History:   Patient Active Problem List   Diagnosis     CARDIOVASCULAR SCREENING; LDL GOAL LESS THAN 160     Obesity, Class I, BMI 30-34.9     No past medical history on file.  No past surgical history on file.    Social History:  Patient reports that he has been smoking cigarettes. He has a 11.00 pack-year smoking history. He has never used smokeless tobacco. He reports that he does not drink alcohol or use drugs. Not . Has two adult children that live with him. Has girlfriend.     Family History:  Family History   Problem Relation Age of Onset     Cancer Mother         liver cancer and skin cancer     Hypertension Maternal Grandfather      Asthma No family hx of      C.A.D. No family hx of      Diabetes No family hx of      Cerebrovascular Disease No family hx of      Breast Cancer No family hx of      Cancer - colorectal No family hx of      Hypertension Maternal Uncle        Medications:  Current Outpatient Medications    Medication Sig Dispense Refill     ibuprofen (ADVIL,MOTRIN) 800 MG tablet Take 1 tablet (800 mg) by mouth every 8 hours as needed for moderate pain 60 tablet 0     permethrin (ELIMITE) 5 % external cream Apply cream from head to toe (execpt the face); leave on for 8-14 hours before washing off with water;  reapply in 1 week if live mites appear. 120 g 1     methylPREDNISolone (MEDROL DOSEPAK) 4 MG tablet Follow package instructions (Patient not taking: Reported on 2/4/2020) 21 tablet 0     triamcinolone (KENALOG) 0.1 % external cream Apply twice daily for 2 weeks to rash, then stop (Patient not taking: Reported on 2/13/2020) 454 g 0       Allergies   Allergen Reactions     Nickel Other (See Comments)     convulsions     Imitrex [Sumatriptan]      Eye pain, stomach pain, headache       Review of Systems:  -Constitutional: Otherwise feeling well today, in usual state of health.  -Skin: As above in HPI. No additional skin concerns.    Physical exam:  Vitals: There were no vitals taken for this visit.   GEN: This is a well developed, well-nourished male in no acute distress, in a pleasant mood.    SKIN: Focused exams of arms and trunk  -No erythema, nodularity or telangiectasias on the site of previous biopsy. .   -Erythematous papules  on trunk/extremeties  -2 cm subcutaneous nodule beneath left cheek  -No other lesions of concern on areas examined.       Impression/Plan:    1. Rash, Erythematous papules and plaques on trunk/extremeties, -New lesions present today treated for scabies, did not resolve. Biopsy for diagnosis today.   -Punch biopsy:  After discussion of benefits and risks including but not limited to bleeding/bruising, pain/swelling, infection, scar, incomplete removal, nerve damage/numbness, recurrence, and non-diagnostic biopsy, written consent, verbal consent and photographs were obtained. Time-out was performed. The area was cleaned with isopropyl alcohol. 0.5mL of 1% lidocaine with epinephrine was  injected to obtain adequate anesthesia of the lesion on the left abdomen. A 4 mm punch biopsy was performed.  4-0 prolene sutures were utilized to approximate the epidermal edges.  White petroleum jelly/VaselineTM and a bandage was applied to the wound.  Explicit verbal and written wound care instructions were provided.  The patient left the Dermatology Clinic in good condition. The patient was counseled to follow up for suture removal in approximately 14 days.        2. 2 cm subcutaneous nodule beneath left cheek - likely cyst. Pt is smoker.  - not discussed today    recommend ENT    Follow-up in 2 weeks for sutures 4 weeks in clinic, earlier for new or changing lesions.       Staff Involved:  Scribe/Staff      Scribe Disclosure  I, Travis Joseph, am serving as a scribe to document services personally performed by Dr. Brigid Pierce MD, based on data collection and the provider's statements to me.    Provider Disclosure:   The documentation recorded by the scribe accurately reflects the services I personally performed and the decisions made by me.    Brigid Pierce MD    Department of Dermatology  [facility]: Phone: [phone], Fax:[phone]  [facility]: Phone: [phone], Fax: [phone]                      Again, thank you for allowing me to participate in the care of your patient.        Sincerely,        Brigid Pierce MD

## 2020-02-13 NOTE — PROGRESS NOTES
[facility] Dermatology Note      Dermatology Problem List:  1. Erythematous papules and plaques- likely scabies  -premethrin, triamcinolone, vanicream not helpful    2.  Verrucous keratosis, right lateral elbow  -s/p shave biopsy 02/04/20    3. 2 cm subcutaneous nodule beneath left cheek    Encounter Date: Feb 13, 2020    CC:  Chief Complaint   Patient presents with     RECHECK     rash- started to clear up and then it reappeared again          History of Present Illness:  Mr. Navdeep Sands is a 44 year old male who presents as a follow up for rash. Last seen on 2/4/2020 when he was treated for scabies.     Today he states he was following all prescribed meds and did not see much benefit.     No other concerns.    Past Medical History:   Patient Active Problem List   Diagnosis     CARDIOVASCULAR SCREENING; LDL GOAL LESS THAN 160     Obesity, Class I, BMI 30-34.9     No past medical history on file.  No past surgical history on file.    Social History:  Patient reports that he has been smoking cigarettes. He has a 11.00 pack-year smoking history. He has never used smokeless tobacco. He reports that he does not drink alcohol or use drugs. Not . Has two adult children that live with him. Has girlfriend.     Family History:  Family History   Problem Relation Age of Onset     Cancer Mother         liver cancer and skin cancer     Hypertension Maternal Grandfather      Asthma No family hx of      C.A.D. No family hx of      Diabetes No family hx of      Cerebrovascular Disease No family hx of      Breast Cancer No family hx of      Cancer - colorectal No family hx of      Hypertension Maternal Uncle        Medications:  Current Outpatient Medications   Medication Sig Dispense Refill     ibuprofen (ADVIL,MOTRIN) 800 MG tablet Take 1 tablet (800 mg) by mouth every 8 hours as needed for moderate pain 60 tablet 0     permethrin (ELIMITE) 5 % external cream Apply cream from head to toe (execpt the  face); leave on for 8-14 hours before washing off with water;  reapply in 1 week if live mites appear. 120 g 1     methylPREDNISolone (MEDROL DOSEPAK) 4 MG tablet Follow package instructions (Patient not taking: Reported on 2/4/2020) 21 tablet 0     triamcinolone (KENALOG) 0.1 % external cream Apply twice daily for 2 weeks to rash, then stop (Patient not taking: Reported on 2/13/2020) 454 g 0       Allergies   Allergen Reactions     Nickel Other (See Comments)     convulsions     Imitrex [Sumatriptan]      Eye pain, stomach pain, headache       Review of Systems:  -Constitutional: Otherwise feeling well today, in usual state of health.  -Skin: As above in HPI. No additional skin concerns.    Physical exam:  Vitals: There were no vitals taken for this visit.   GEN: This is a well developed, well-nourished male in no acute distress, in a pleasant mood.    SKIN: Focused exams of arms and trunk  -No erythema, nodularity or telangiectasias on the site of previous biopsy. .   -Erythematous papules  on trunk/extremeties  -2 cm subcutaneous nodule beneath left cheek  -No other lesions of concern on areas examined.       Impression/Plan:    1. Rash, Erythematous papules and plaques on trunk/extremeties, -New lesions present today treated for scabies, did not resolve. Biopsy for diagnosis today.   -Punch biopsy:  After discussion of benefits and risks including but not limited to bleeding/bruising, pain/swelling, infection, scar, incomplete removal, nerve damage/numbness, recurrence, and non-diagnostic biopsy, written consent, verbal consent and photographs were obtained. Time-out was performed. The area was cleaned with isopropyl alcohol. 0.5mL of 1% lidocaine with epinephrine was injected to obtain adequate anesthesia of the lesion on the left abdomen. A 4 mm punch biopsy was performed.  4-0 prolene sutures were utilized to approximate the epidermal edges.  White petroleum jelly/VaselineTM and a bandage was applied to the  wound.  Explicit verbal and written wound care instructions were provided.  The patient left the Dermatology Clinic in good condition. The patient was counseled to follow up for suture removal in approximately 14 days.        2. 2 cm subcutaneous nodule beneath left cheek - likely cyst. Pt is smoker.  - not discussed today    recommend ENT    Follow-up in 2 weeks for sutures 4 weeks in clinic, earlier for new or changing lesions.       Staff Involved:  Scribe/Staff      Scribe Disclosure  I, Travis Joseph, am serving as a scribe to document services personally performed by Dr. Brigid Pierce MD, based on data collection and the provider's statements to me.    Provider Disclosure:   The documentation recorded by the scribe accurately reflects the services I personally performed and the decisions made by me.    Brigid Pierce MD    Department of Dermatology  [facility]: Phone: [phone], Fax:[phone]  [facility]: Phone: [phone], Fax: [phone]

## 2020-02-24 ENCOUNTER — TELEPHONE (OUTPATIENT)
Dept: DERMATOLOGY | Facility: CLINIC | Age: 45
End: 2020-02-24

## 2020-02-24 ENCOUNTER — HEALTH MAINTENANCE LETTER (OUTPATIENT)
Age: 45
End: 2020-02-24

## 2020-02-24 LAB — COPATH REPORT: NORMAL

## 2020-02-24 NOTE — TELEPHONE ENCOUNTER
[facility] Call Center    Phone Message    May a detailed message be left on voicemail: yes     Reason for Call: Requesting Results   Name/type of test: Dermatological path order and indications [[id number]] (Order [id number])     Date of test: 2/13/2020  Was test done at a location other than [facility] (Please fill in the location if not [facility])?: No      Action Taken: Message routed to:  Adult Clinics: Dermatology p [id number]

## 2020-02-24 NOTE — TELEPHONE ENCOUNTER
"Called and informed patient of results from biopsy on eyebrow. Patient verbalized understanding to this.     Patient questioning on results from biopsy on abdomen for rash. Informed that it can take up to two weeks for results and that it is still \"in process\". Asked that if patient does not hear from us by Thursday to please contact the office to see if results are completed.     Annita Fung LPN    "

## 2020-02-25 ENCOUNTER — TELEPHONE (OUTPATIENT)
Dept: DERMATOLOGY | Facility: CLINIC | Age: 45
End: 2020-02-25

## 2020-03-02 ENCOUNTER — TELEPHONE (OUTPATIENT)
Dept: DERMATOLOGY | Facility: CLINIC | Age: 45
End: 2020-03-02

## 2020-03-02 NOTE — TELEPHONE ENCOUNTER
[facility] Call Center    Phone Message    May a detailed message be left on voicemail: yes     Reason for Call: Patient called stating that he is having surgery next week and Dr. Kerns wants to know if he is cleared by Dr. Pierce for it. Please advise. Thank you.     Action Taken: [id number]    Travel Screening: Not Applicable

## 2020-03-02 NOTE — TELEPHONE ENCOUNTER
Is his skin now clear? I not set up with with Tejas garcia for second opinion    They want us to clear his skin first.

## 2020-03-02 NOTE — TELEPHONE ENCOUNTER
Forwarding to DR. Pierce to review and advise.  Per Care Everywhere, patient is having orthopedic surgery for a right hip labral tear.    2/13/20 biopsy showed:  FINAL DIAGNOSIS:   Skin, abdomen, punch:   - Atypical lymphoid infiltrate - (see comment and description)     Gina Bacon RN

## 2020-03-03 NOTE — TELEPHONE ENCOUNTER
"Patient reports he is 99% better.  He has been using the triamcinolone.  Since he is improving he hasn't made an appt with Dr. Oliveros.  I left a message with TARAH AldridgeCC to Dr. Abdon Rich at [facility] in [place] notifying her of this information.  Call back # provided if any questions.    Gina Bacon RN        Notes recorded by Jennifer Moran LPN on 2/25/2020 at 3:14 PM CST  Spoke to patient regarding results. Patient reports he has not stayed at any hotel within the last three months. He states he has checked and cleaned his mattress and does not \"see any bugs.\" He will continue using the triamcinolone and follow up in March with Dr. Pierce. Will send [employer] message with information for Dr. Oliveros if patient does not see improvement.  Jennifer Moran LPN    ------    Notes recorded by Brigid Pierce MD on 2/25/2020 at 1:32 PM CST  Call and see how patient is doing. It is most consistent with bug bites. I Think he should use the triamcinolone. I    If not better I want him to see Dr. Oliveros for second opinion before prednisone    See if he has any bed bug exposure with recent hotel stays in the last 3 months.  "

## 2020-12-13 ENCOUNTER — HEALTH MAINTENANCE LETTER (OUTPATIENT)
Age: 45
End: 2020-12-13

## 2021-04-17 ENCOUNTER — HEALTH MAINTENANCE LETTER (OUTPATIENT)
Age: 46
End: 2021-04-17

## 2021-07-17 ENCOUNTER — APPOINTMENT (OUTPATIENT)
Dept: GENERAL RADIOLOGY | Facility: CLINIC | Age: 46
End: 2021-07-17
Attending: NURSE PRACTITIONER
Payer: COMMERCIAL

## 2021-07-17 ENCOUNTER — APPOINTMENT (OUTPATIENT)
Dept: CT IMAGING | Facility: CLINIC | Age: 46
End: 2021-07-17
Attending: NURSE PRACTITIONER
Payer: COMMERCIAL

## 2021-07-17 ENCOUNTER — HOSPITAL ENCOUNTER (EMERGENCY)
Facility: CLINIC | Age: 46
Discharge: HOME | End: 2021-07-17
Attending: NURSE PRACTITIONER | Admitting: NURSE PRACTITIONER
Payer: COMMERCIAL

## 2021-07-17 VITALS
SYSTOLIC BLOOD PRESSURE: 128 MMHG | OXYGEN SATURATION: 97 % | DIASTOLIC BLOOD PRESSURE: 86 MMHG | TEMPERATURE: 98 F | BODY MASS INDEX: 32.71 KG/M2 | RESPIRATION RATE: 18 BRPM | HEART RATE: 78 BPM | WEIGHT: 234.5 LBS

## 2021-07-17 DIAGNOSIS — M54.6 BILATERAL THORACIC BACK PAIN: ICD-10-CM

## 2021-07-17 DIAGNOSIS — M54.2 NECK PAIN: ICD-10-CM

## 2021-07-17 DIAGNOSIS — M25.551 HIP PAIN, RIGHT: ICD-10-CM
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
## 2021-07-17 DIAGNOSIS — M54.42 BILATERAL LOW BACK PAIN WITH LEFT-SIDED SCIATICA: ICD-10-CM

## 2021-07-17 PROCEDURE — 72100 X-RAY EXAM L-S SPINE 2/3 VWS: CPT

## 2021-07-17 PROCEDURE — 72125 CT NECK SPINE W/O DYE: CPT

## 2021-07-17 PROCEDURE — 99285 EMERGENCY DEPT VISIT HI MDM: CPT | Mod: 25 | Performed by: NURSE PRACTITIONER

## 2021-07-17 PROCEDURE — 73502 X-RAY EXAM HIP UNI 2-3 VIEWS: CPT

## 2021-07-17 PROCEDURE — 99284 EMERGENCY DEPT VISIT MOD MDM: CPT | Performed by: NURSE PRACTITIONER

## 2021-07-17 PROCEDURE — 72072 X-RAY EXAM THORAC SPINE 3VWS: CPT

## 2021-07-17 PROCEDURE — 72040 X-RAY EXAM NECK SPINE 2-3 VW: CPT

## 2021-07-17 ASSESSMENT — ENCOUNTER SYMPTOMS
COUGH: 0
TROUBLE SWALLOWING: 0
NECK PAIN: 1
FEVER: 0
NECK STIFFNESS: 0
SLEEP DISTURBANCE: 0
HEADACHES: 0
ARTHRALGIAS: 1
DIZZINESS: 0
BACK PAIN: 1
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
## 2021-07-19 ENCOUNTER — PATIENT OUTREACH (OUTPATIENT)
Dept: FAMILY MEDICINE | Facility: CLINIC | Age: 46
End: 2021-07-19
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
## 2021-07-20 ENCOUNTER — OFFICE VISIT (OUTPATIENT)
Dept: FAMILY MEDICINE | Facility: CLINIC | Age: 46
End: 2021-07-20
Payer: COMMERCIAL

## 2021-07-20 VITALS
HEIGHT: 71 IN | BODY MASS INDEX: 33.18 KG/M2 | SYSTOLIC BLOOD PRESSURE: 101 MMHG | WEIGHT: 237 LBS | HEART RATE: 82 BPM | OXYGEN SATURATION: 98 % | DIASTOLIC BLOOD PRESSURE: 59 MMHG | TEMPERATURE: 97.9 F

## 2021-07-20 DIAGNOSIS — M48.02 CERVICAL STENOSIS OF SPINAL CANAL: Primary | ICD-10-CM

## 2021-07-20 DIAGNOSIS — M47.892 OTHER OSTEOARTHRITIS OF SPINE, CERVICAL REGION: ICD-10-CM

## 2021-07-20 DIAGNOSIS — M54.50 ACUTE MIDLINE LOW BACK PAIN WITHOUT SCIATICA: ICD-10-CM
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
## 2021-07-20 PROCEDURE — 99214 OFFICE O/P EST MOD 30 MIN: CPT | Performed by: PHYSICIAN ASSISTANT

## 2021-07-20 RX ORDER — PREDNISONE 20 MG/1
TABLET ORAL
Qty: 20 TABLET | Refills: 0 | Status: SHIPPED | OUTPATIENT
Start: 2021-07-20 | End: 2022-11-03

## 2021-07-20 RX ORDER — SENNOSIDES 8.6 MG
650 CAPSULE ORAL EVERY 8 HOURS PRN
Qty: 60 TABLET | Refills: 1 | Status: SHIPPED | OUTPATIENT
Start: 2021-07-20

## 2021-07-20 RX ORDER — CYCLOBENZAPRINE HCL 5 MG
5 TABLET ORAL 3 TIMES DAILY PRN
Qty: 12 TABLET | Refills: 0 | Status: SHIPPED | OUTPATIENT
Start: 2021-07-20 | End: 2022-11-03

## 2021-07-20 ASSESSMENT — MIFFLIN-ST. JEOR: SCORE: 1972.15
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
## 2021-07-29 ENCOUNTER — OFFICE VISIT (OUTPATIENT)
Dept: NEUROSURGERY | Facility: CLINIC | Age: 46
End: 2021-07-29
Attending: PHYSICIAN ASSISTANT
Payer: COMMERCIAL

## 2021-07-29 VITALS — SYSTOLIC BLOOD PRESSURE: 114 MMHG | HEART RATE: 60 BPM | OXYGEN SATURATION: 90 % | DIASTOLIC BLOOD PRESSURE: 75 MMHG

## 2021-07-29 DIAGNOSIS — M54.9 MID BACK PAIN: ICD-10-CM

## 2021-07-29 DIAGNOSIS — M48.02 CERVICAL STENOSIS OF SPINAL CANAL: ICD-10-CM

## 2021-07-29 DIAGNOSIS — M54.2 CERVICALGIA: Primary | ICD-10-CM

## 2021-07-29 PROCEDURE — 99024 POSTOP FOLLOW-UP VISIT: CPT | Performed by: NURSE PRACTITIONER
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
## 2021-07-29 NOTE — PATIENT INSTRUCTIONS
Order for cervical and thoracic spine MRIs. I will call with the results and next steps.     Please call our clinic if symptoms persist, change, or worsen at any time.    Dilia Huber CNP  [facility]  [address] [address]  Tel [phone]  Pager [phone]
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
## 2021-08-04 ENCOUNTER — HOSPITAL ENCOUNTER (OUTPATIENT)
Dept: MRI IMAGING | Facility: CLINIC | Age: 46
Discharge: HOME | End: 2021-08-04
Attending: NURSE PRACTITIONER | Admitting: NURSE PRACTITIONER
Payer: COMMERCIAL

## 2021-08-04 DIAGNOSIS — M54.9 MID BACK PAIN: ICD-10-CM

## 2021-08-04 DIAGNOSIS — M54.2 CERVICALGIA: ICD-10-CM

## 2021-08-04 DIAGNOSIS — M48.02 CERVICAL STENOSIS OF SPINAL CANAL: ICD-10-CM

## 2021-08-04 PROCEDURE — 72141 MRI NECK SPINE W/O DYE: CPT

## 2021-08-04 PROCEDURE — 72146 MRI CHEST SPINE W/O DYE: CPT

## 2021-08-05 ENCOUNTER — TELEPHONE (OUTPATIENT)
Dept: NEUROSURGERY | Facility: CLINIC | Age: 46
End: 2021-08-05

## 2021-08-05 NOTE — TELEPHONE ENCOUNTER
Reviewed imaging. MRI cervical spine shows multilevel degenerative changes most pronounced at C6-C7, foraminal narrowing at C3-C4, C4-C5, and C6-C7. There is congenital fusion of the posterior elements of C7 and T1. Normal MRI of the thoracic spine, no disc herniation, no spinal canal or foraminal narrowing.    Recommend referrals to PT and pain clinic for cervical CHRISTINA. LVM with patient, advised to call back to discuss with care team.     Dilia Huber CNP  [facility] Neurosurgery  [address] [address]  Tel [phone]  Pager [phone]

## 2021-09-09 NOTE — TELEPHONE ENCOUNTER
Addended by: ERICK CANO on: 9/9/2021 02:07 PM     Modules accepted: Orders     Attempted to call pharmacy.  It is closed at this time.  Will try again later.    Yesi Colin, CMA

## 2021-09-26 ENCOUNTER — HEALTH MAINTENANCE LETTER (OUTPATIENT)
Age: 46
End: 2021-09-26

## 2022-02-03 ENCOUNTER — OFFICE VISIT (OUTPATIENT)
Dept: FAMILY MEDICINE | Facility: CLINIC | Age: 47
End: 2022-02-03
Payer: COMMERCIAL

## 2022-02-03 VITALS
OXYGEN SATURATION: 100 % | TEMPERATURE: 97.5 F | HEART RATE: 97 BPM | BODY MASS INDEX: 33.05 KG/M2 | DIASTOLIC BLOOD PRESSURE: 70 MMHG | SYSTOLIC BLOOD PRESSURE: 126 MMHG | WEIGHT: 237 LBS

## 2022-02-03 DIAGNOSIS — L03.319 CELLULITIS AND ABSCESS OF TRUNK: Primary | ICD-10-CM

## 2022-02-03 DIAGNOSIS — L02.219 CELLULITIS AND ABSCESS OF TRUNK: Primary | ICD-10-CM

## 2022-02-03 PROCEDURE — 99214 OFFICE O/P EST MOD 30 MIN: CPT | Performed by: PHYSICIAN ASSISTANT

## 2022-02-03 RX ORDER — SULFAMETHOXAZOLE/TRIMETHOPRIM 800-160 MG
1 TABLET ORAL 2 TIMES DAILY
Qty: 20 TABLET | Refills: 0 | Status: SHIPPED | OUTPATIENT
Start: 2022-02-03 | End: 2022-02-13

## 2022-02-03 NOTE — PROGRESS NOTES
Assessment & Plan     Cellulitis and abscess of trunk  - sulfamethoxazole-trimethoprim (BACTRIM DS) 800-160 MG tablet; Take 1 tablet by mouth 2 times daily for 10 days    Follow up with primary care provider if symptoms worsen or do not improve in 3-4 days    30 minutes spent on the date of the encounter doing chart review, patient visit and documentation     Return in about 1 week (around 2/10/2022) for visit with primary care provider if not improving.    Mary Kate Yepez PA-C  [facility] SUMIT Sethi is a 46 year old who presents for the following health issues   HPI     Started 2 days ago, cyst on lower middle back, doubled in size in since yesterday    She presents to the clinic today for evaluation of a presumed abscess. Symptoms first began 2 days ago. He notes that he did have a similar lesion about 2 weeks ago that was lowered on his back. He squeezed this and expressed some purulent discharge. This lesion just began 2 days ago and when he woke up this morning he noticed that it had doubled in size. It is warm, red, tender. There is a central puncture noted and he notes that nothing is draining from this lesion.    Review of Systems   ROS negative except as stated above.        Objective    /70   Pulse 97   Temp 97.5  F (36.4  C) (Temporal)   Wt 107.5 kg (237 lb)   SpO2 100%   BMI 33.05 kg/m    Body mass index is 33.05 kg/m .  Physical Exam   GENERAL: healthy, alert and no distress  MS: no gross musculoskeletal defects noted, no edema  SKIN: lower mid back with round firm abscess, overlying skin warm, erythematous and indurated. Central puncture noted. Effected area cleaned with betadine x 3 then wiped away with alcoholol.  1 pecent lidocaine with epineprhine used to infiltrate the area with good anethesia.  Number 11 scapel used to make a stab incion.  Bloody, minimally purlent drainage was removed . No gauze was needed as area was small. Appropraite wound care  dressing applied.  Pt tolerated preocedure well.

## 2022-02-04 NOTE — PATIENT INSTRUCTIONS
Patient Education     Abscess (Incision & Drainage)  An abscess is sometimes called a boil. It happens when bacteria get trapped under the skin and start to grow. Pus forms inside the abscess as the body responds to the bacteria. An abscess can happen with an insect bite, ingrown hair, blocked oil gland, pimple, cyst, or puncture wound.   Your healthcare provider has drained the pus from your abscess. If the abscess pocket was large, your healthcare provider may have put in gauze packing. Your provider will need to remove or replace it on your next visit. . You may not need antibiotics to treat a simple abscess, unless the infection is spreading into the skin around the wound (cellulitis).   The wound will take about 1 to 2 weeks to heal, depending on the size of the abscess. Healthy tissue will grow from the bottom and sides of the opening until it seals over.   Home care  These tips can help your wound heal:    The wound may drain for the first 2 days. Cover the wound with a clean dry dressing. Change the dressing if it becomes soaked with blood or pus.    If a gauze packing was placed inside the abscess pocket, you may be told to remove it yourself. You may do this in the shower. Once the packing is removed, you should wash the area in the shower, or clean the area as directed by your provider. Continue to do this until the skin opening has closed. Make sure you wash your hands after changing the packing or cleaning the wound.    If you were prescribed antibiotics, take them as directed until they are all gone.    You may use acetaminophen or ibuprofen to control pain, unless another pain medicine was prescribed. If you have liver disease or ever had a stomach ulcer, talk with your healthcare provider before using these medicines.  Follow-up care  Follow up with your healthcare provider, or as advised. If a gauze packing was put in your wound, it should be removed in 1 to 2 days. Check your wound every day for  any signs that the infection is getting worse. The signs are listed below.   When to seek medical advice  Call your healthcare provider right away if any of these occur:    Increasing redness or swelling    Red streaks in the skin leading away from the wound    Increasing local pain or swelling    Continued pus draining from the wound 2 days after treatment    Fever of 100.4 F (38 C) or higher, or as directed by your healthcare provider    Boil returns when you are at home  Amber last reviewed this educational content on 8/1/2019 2000-2021 [employer]. All rights reserved. This information is not intended as a substitute for professional medical care. Always follow your healthcare professional's instructions.

## 2022-05-08 ENCOUNTER — HEALTH MAINTENANCE LETTER (OUTPATIENT)
Age: 47
End: 2022-05-08

## 2022-08-05 NOTE — TELEPHONE ENCOUNTER
"Notes recorded by Jennifer Moran LPN on 2/25/2020 at 3:14 PM CST  Spoke to patient regarding results. Patient reports he has not stayed at any hotel within the last three months. He states he has checked and cleaned his mattress and does not \"see any bugs.\" He will continue using the triamcinolone and follow up in March with Dr. Pierce. Will send [employer] message with information for Dr. Oliveros if patient does not see improvement.  Jennifer Moran LPN    ------    Notes recorded by Brigid Pierce MD on 2/25/2020 at 1:32 PM CST  Call and see how patient is doing. It is most consistent with bug bites. I Think he should use the triamcinolone. I    If not better I want him to see Dr. Oliveros for second opinion before prednisone    See if he has any bed bug exposure with recent hotel stays in the last 3 months.  " no

## 2022-11-03 ENCOUNTER — APPOINTMENT (OUTPATIENT)
Dept: ULTRASOUND IMAGING | Facility: CLINIC | Age: 47
End: 2022-11-03
Attending: EMERGENCY MEDICINE
Payer: COMMERCIAL

## 2022-11-03 ENCOUNTER — HOSPITAL ENCOUNTER (EMERGENCY)
Facility: CLINIC | Age: 47
Discharge: HOME | End: 2022-11-03
Attending: EMERGENCY MEDICINE | Admitting: EMERGENCY MEDICINE
Payer: COMMERCIAL

## 2022-11-03 VITALS
BODY MASS INDEX: 32.2 KG/M2 | WEIGHT: 230 LBS | HEART RATE: 79 BPM | TEMPERATURE: 97.7 F | DIASTOLIC BLOOD PRESSURE: 74 MMHG | RESPIRATION RATE: 18 BRPM | SYSTOLIC BLOOD PRESSURE: 117 MMHG | OXYGEN SATURATION: 97 % | HEIGHT: 71 IN

## 2022-11-03 DIAGNOSIS — M54.41 ACUTE BILATERAL LOW BACK PAIN WITH RIGHT-SIDED SCIATICA: ICD-10-CM

## 2022-11-03 PROCEDURE — 93976 VASCULAR STUDY: CPT

## 2022-11-03 PROCEDURE — 96375 TX/PRO/DX INJ NEW DRUG ADDON: CPT | Performed by: EMERGENCY MEDICINE

## 2022-11-03 PROCEDURE — 99285 EMERGENCY DEPT VISIT HI MDM: CPT | Mod: 25 | Performed by: EMERGENCY MEDICINE

## 2022-11-03 PROCEDURE — 96374 THER/PROPH/DIAG INJ IV PUSH: CPT | Performed by: EMERGENCY MEDICINE

## 2022-11-03 PROCEDURE — 99285 EMERGENCY DEPT VISIT HI MDM: CPT | Performed by: EMERGENCY MEDICINE

## 2022-11-03 PROCEDURE — 250N000011 HC RX IP 250 OP 636: Performed by: EMERGENCY MEDICINE

## 2022-11-03 RX ORDER — OXYCODONE HYDROCHLORIDE 5 MG/1
5 TABLET ORAL EVERY 6 HOURS PRN
Qty: 10 TABLET | Refills: 0 | Status: SHIPPED | OUTPATIENT
Start: 2022-11-03

## 2022-11-03 RX ORDER — NAPROXEN 500 MG/1
500 TABLET ORAL 2 TIMES DAILY PRN
Qty: 10 TABLET | Refills: 0 | Status: SHIPPED | OUTPATIENT
Start: 2022-11-03 | End: 2022-11-08

## 2022-11-03 RX ORDER — KETOROLAC TROMETHAMINE 15 MG/ML
15 INJECTION, SOLUTION INTRAMUSCULAR; INTRAVENOUS ONCE
Status: COMPLETED | OUTPATIENT
Start: 2022-11-03 | End: 2022-11-03

## 2022-11-03 RX ADMIN — HYDROMORPHONE HYDROCHLORIDE 1 MG: 1 INJECTION, SOLUTION INTRAMUSCULAR; INTRAVENOUS; SUBCUTANEOUS at 11:36

## 2022-11-03 RX ADMIN — KETOROLAC TROMETHAMINE 15 MG: 15 INJECTION, SOLUTION INTRAMUSCULAR; INTRAVENOUS at 11:41

## 2022-11-03 ASSESSMENT — ENCOUNTER SYMPTOMS
ABDOMINAL PAIN: 0
NUMBNESS: 0
DIFFICULTY URINATING: 0
BACK PAIN: 1
WEAKNESS: 0
FEVER: 0
DYSURIA: 0
FREQUENCY: 1

## 2022-11-03 ASSESSMENT — ACTIVITIES OF DAILY LIVING (ADL)
ADLS_ACUITY_SCORE: 33
ADLS_ACUITY_SCORE: 35

## 2022-11-03 NOTE — DISCHARGE INSTRUCTIONS
I am happy that your back pain has improved.  I would anticipate gradual improvement over the next 1 to 2 weeks.  You should focus as we discussed improved posture, limiting bending over and picking up heavy objects.  I did place outpatient follow-up for you as that anticipate ongoing symptoms that you would benefit from physical therapy and possibly seeing pain management for local injection.  Should you notice increasing back pain, loss of sensation, control of your bowel or bladder, weakness I would want you to return emergently.  Otherwise please utilize the anti-inflammatory medications as needed.  Regarding the intermittent testicular pain x1 year the ultrasound demonstrates no sign of acute cause for this.  Should you choose and want outpatient follow-up please discuss with your primary care physician for consideration of evaluation by urology should symptoms persist.

## 2022-11-03 NOTE — ED PROVIDER NOTES
History     Chief Complaint   Patient presents with     Back Pain     HPI  Navdeep Sands is a 46 year old male who arrives today to the emergency department evaluation of lumbar back pain.  Patient reports he is unsure if he hurt this at work or slept wrong but developed lumbar back pain.  Over the past several days is been moderate to significant.  He reports some pain in the lower lumbar region with radiation down the back of his right leg.  He does report similar symptoms in the past treated with physical therapy and medication.  He was told at one point he had a bulging disc.  No prior surgeries.  He denies direct trauma.  He reports no lower extremity weakness or numbness.  No loss of bowel or bladder control.  No fevers.  He denies generalized abdominal pain.  Patient also reports he had a fall approximately year ago and has had some persistent right testicle and inguinal pain.  This has been persistent without obvious exacerbating or alleviating symptoms.  This has been somewhat worsened over the past 1 week.  No dysuria, urinary frequency.  No trauma or bulging.    Allergies:  Allergies   Allergen Reactions     Nickel Other (See Comments)     convulsions     Imitrex [Sumatriptan]      Eye pain, stomach pain, headache       Problem List:    Patient Active Problem List    Diagnosis Date Noted     Obesity, Class I, BMI 30-34.9 10/22/2015     Priority: Medium     CARDIOVASCULAR SCREENING; LDL GOAL LESS THAN 160 03/11/2014     Priority: Medium        Past Medical History:    No past medical history on file.    Past Surgical History:    Past Surgical History:   Procedure Laterality Date     ORTHOPEDIC SURGERY       OTHER SURGICAL HISTORY      no prior surgeries       Family History:    Family History   Problem Relation Age of Onset     Cancer Mother         liver cancer and skin cancer     Hypertension Maternal Grandfather      Asthma No family hx of      C.A.D. No family hx of      Diabetes No family hx of       "Cerebrovascular Disease No family hx of      Breast Cancer No family hx of      Cancer - colorectal No family hx of      Hypertension Maternal Uncle      Liver Cancer Mother         s/p liver transplant     Alcoholism Other        Social History:  Marital Status:  Single [1]  Social History     Tobacco Use     Smoking status: Every Day     Packs/day: 0.50     Years: 22.00     Pack years: 11.00     Types: Cigarettes     Smokeless tobacco: Never   Vaping Use     Vaping Use: Every day   Substance Use Topics     Alcohol use: No     Drug use: No        Medications:    naproxen (NAPROSYN) 500 MG tablet  oxyCODONE (ROXICODONE) 5 MG tablet  acetaminophen (TYLENOL) 650 MG CR tablet          Review of Systems   Constitutional: Negative for fever.   Gastrointestinal: Negative for abdominal pain.   Genitourinary: Positive for frequency and testicular pain. Negative for decreased urine volume, difficulty urinating, dysuria, genital sores, penile pain, scrotal swelling and urgency.   Musculoskeletal: Positive for back pain.   Neurological: Negative for weakness and numbness.   All other systems reviewed and are negative.      Physical Exam   BP: (!) 148/89  Pulse: 79  Temp: 97.7  F (36.5  C)  Resp: 22  Height: 180.3 cm (5' 11\")  Weight: 104.3 kg (230 lb)  SpO2: 97 %      Physical Exam  Vitals and nursing note reviewed.   Constitutional:       General: He is in acute distress.      Appearance: He is not ill-appearing or diaphoretic.   HENT:      Head: Normocephalic and atraumatic.   Cardiovascular:      Rate and Rhythm: Normal rate.      Pulses: Normal pulses.   Genitourinary:     Penis: No tenderness or swelling.       Epididymis:      Right: No mass or tenderness.   Musculoskeletal:      Lumbar back: Tenderness present. No deformity or spasms. Normal range of motion.   Skin:     General: Skin is warm and dry.      Capillary Refill: Capillary refill takes less than 2 seconds.      Coloration: Skin is not pale.      Findings: No " erythema or rash.   Neurological:      General: No focal deficit present.      Mental Status: He is alert and oriented to person, place, and time.      Cranial Nerves: No cranial nerve deficit.      Sensory: No sensory deficit.      Motor: No weakness.      Coordination: Coordination normal.   Psychiatric:         Behavior: Behavior normal.         ED Course                 Procedures    Results for orders placed or performed during the hospital encounter of 11/03/22 (from the past 24 hour(s))   US Testicular & Scrotum w Doppler Ltd    Narrative    ULTRASOUND TESTICULAR AND SCROTUM WITH DOPPLER LIMITED  11/3/2022  12:15 PM     HISTORY: Right testicular pain.    FINDINGS:   Right Scrotum:       Testis: 3 mm focal calcification/calcified granuloma.       Epididymis: Few epididymal cysts including 6 and 5 mm cyst in the  epididymal head and 1.5 cm in the epididymal tail.       No hydrocele or varicocele.    Left Scrotum:       Testis: 2 mm focal calcification/calcified granuloma.       Epididymis: Few epididymal head cysts measure up to 5 mm.        No hydrocele or varicocele.     Doppler waveform analysis demonstrates bilateral testicular flow  without evidence for torsion.            Impression    IMPRESSION:   1. No sonographic findings to explain patient's symptoms of right  testicular pain.  2. Few calcified foci in both testicles.  3. Bilateral epididymal cysts measuring up to 1.5 cm in the right  epididymal tail.        Medications   HYDROmorphone (DILAUDID) injection 1 mg (1 mg Intravenous Given 11/3/22 1136)   ketorolac (TORADOL) injection 15 mg (15 mg Intravenous Given 11/3/22 1141)       Assessments & Plan (with Medical Decision Making)     I have reviewed the nursing notes.    I have reviewed the findings, diagnosis, plan and need for follow up with the patient.    Navdeep Sands is a 46 year old male who arrives today to the emergency department evaluation of lumbar back pain.  Upon arrival patient noted be  alert peers presently afebrile and hemodynamically stable.  He appears to be slightly uncomfortable but is nontoxic.  He does have some pain with palpation of lower back.  There is no overlying erythema.  No evidence of trauma.  Lower extremity neurovascular examination normal.  Strength and sensation full and without limitation.  Low suspicion for red flag or emergent neurosurgical cause for back pain such as cauda equina or conus medullaris.  Symptoms most consistent with sciatica.  Discussed plan for symptomatic management.  At this time I do not believe an MRI is warranted emergently as it is unlikely to .  Regarding testicular pain there is no reproducible pain, palpable mass, inflammation of the epididymis by palpation.  No inguinal hernia.  I did offer and perform an ultrasound demonstrating no sign of obvious acute pathology.  On reevaluation patient has had resolution of pain down to a 3 of 10.  He is ambulating.  I did place outpatient follow-up in the spine center for the patient for consideration of physical therapy and local injection.  He will monitor symptoms closely for red flag symptoms and call or return emergently should he develop these.  Otherwise I discussed plan for outpatient follow-up with his PCP should he have a need for chronic testicular pain.    New Prescriptions    NAPROXEN (NAPROSYN) 500 MG TABLET    Take 1 tablet (500 mg) by mouth 2 times daily as needed for moderate pain    OXYCODONE (ROXICODONE) 5 MG TABLET    Take 1 tablet (5 mg) by mouth every 6 hours as needed for pain       Final diagnoses:   Acute bilateral low back pain with right-sided sciatica       11/3/2022   [facility] EMERGENCY DEPT     Nghia Sanchez MD  11/03/22 1963

## 2022-11-03 NOTE — ED TRIAGE NOTES
Patient presents with low back pain for a few days. He states he may have slept wrong. He reports R) hip surgery 1 year ago. Linda Luna RN

## 2022-11-11 ENCOUNTER — OFFICE VISIT (OUTPATIENT)
Dept: PALLIATIVE MEDICINE | Facility: CLINIC | Age: 47
End: 2022-11-11
Attending: EMERGENCY MEDICINE
Payer: COMMERCIAL

## 2022-11-11 VITALS — HEART RATE: 85 BPM | DIASTOLIC BLOOD PRESSURE: 85 MMHG | SYSTOLIC BLOOD PRESSURE: 120 MMHG

## 2022-11-11 DIAGNOSIS — M54.16 LUMBAR RADICULOPATHY: Primary | ICD-10-CM

## 2022-11-11 DIAGNOSIS — M54.41 ACUTE BILATERAL LOW BACK PAIN WITH RIGHT-SIDED SCIATICA: ICD-10-CM

## 2022-11-11 PROCEDURE — 99204 OFFICE O/P NEW MOD 45 MIN: CPT | Performed by: PAIN MEDICINE

## 2022-11-11 RX ORDER — NAPROXEN 500 MG/1
500 TABLET ORAL 2 TIMES DAILY WITH MEALS
COMMUNITY

## 2022-11-11 RX ORDER — GABAPENTIN 100 MG/1
100 CAPSULE ORAL 3 TIMES DAILY
Qty: 90 CAPSULE | Refills: 1 | Status: SHIPPED | OUTPATIENT
Start: 2022-11-11

## 2022-11-11 ASSESSMENT — PAIN SCALES - GENERAL: PAINLEVEL: EXTREME PAIN (9)

## 2022-11-11 NOTE — PATIENT INSTRUCTIONS
- Further procedures recommended: will first get imaging   - Medication Management:   - - would start gabapentin low dose 100mg three times a day. 100mg in the pm for 3 days--> add 100mg in the am for 3 days--> 100mg in the afternoon as tolerated. Will continue to titrate as tolerated.    - Physical Therapy: order placed for PHYSICAL THERAPY    - Clinical Health Psychologist to address issues of relaxation, behavioral change, coping style, and other factors important to improvement: no  - Diagnostic Studies: ordered Lumbar MRI   - Urine toxicology screen today: no   - Follow up:    - Will call with results of MRI           ----------------------------------------------------------------  Clinic Number:  [phone]   Call with any questions about your care and for scheduling assistance.   Calls are returned Monday through Friday between 8 AM and 4:30 PM. We usually get back to you within 2 business days depending on the issue/request.    If we are prescribing your medications:  For opioid medication refills, call the clinic or send a Wireless Tech message 7 days in advance.  Please include:  Name of requested medication  Name of the pharmacy.  For non-opioid medications, call your pharmacy directly to request a refill. Please allow 3-4 days to be processed.   Per [place] State Law:  All controlled substance prescriptions must be filled within 30 days of being written.    For those controlled substances allowing refills, pickup must occur within 30 days of last fill.      We believe regular attendance is key to your success in our program!    Any time you are unable to keep your appointment we ask that you call us at least 24 hours in advance to cancel.This will allow us to offer the appointment time to another patient.   Multiple missed appointments may lead to dismissal from the clinic.

## 2022-11-11 NOTE — PROGRESS NOTES
[facility]    Date of visit: 11/11/2022    Primary Care Provider: Dr. Patricia Domingo    Reason for consultation:    Navdeep Sands is a 46 year old male who is seen in consultation today at the request of his primary care physician, Patricia Domingo .       Chief Complaint:    r lbp radiating to his LE     Pain history:  Navdeep Sands is a 46 year old male with a PMH significant for LBP and hip surgery  with new progressive pain   Approx 10days ago had a slip in the garage  At that time he had severe rlbp radiating to his LE   The symptoms are mainly over the dorsal surface and medial maleolus  The pain varies in nature and severity  The pain has been constant of late  The pain is stabbing  The pain is electrical   The pt reports tingling down his ant leg  Worse with bending  Worse with lifting  Worse with activity  The pt has essentially been on bed rest  Slightly better with rest  The pt has not been able to work   Denies any prgressive weakness  Denies any new incontinence    Pain Affecting his sleep  Pain sig affecting his quality of life    Trying stretching, does not have a regimen   Denies red flags including: bowel or bladder symptoms, fever, chills, saddle anesthesia, profound motor loss, history of cancer, history of immune compromise, weight loss.     Impact of Pain: There is significant limitation     Current medication treatments include:  Short rx oxy  Pain medications are being prescribed by .    Previous medication treatments included:  N/a     Other treatments have included:  Navdeep Sands has not been seen at a pain clinic in the past.      PT: no current pt regimen       Interventional:  Acupuncture/chiro: n  TENs Unit:N  Injections: no{      Past Medical History:  No past medical history on file.  Past Surgical History:  Past Surgical History:   Procedure Laterality Date     ORTHOPEDIC SURGERY       OTHER SURGICAL HISTORY      no prior surgeries      Medications:  Current Outpatient Medications   Medication Sig Dispense Refill     naproxen (NAPROSYN) 500 MG tablet Take 500 mg by mouth 2 times daily (with meals)       oxyCODONE (ROXICODONE) 5 MG tablet Take 1 tablet (5 mg) by mouth every 6 hours as needed for pain 10 tablet 0     acetaminophen (TYLENOL) 650 MG CR tablet Take 1 tablet (650 mg) by mouth every 8 hours as needed for pain (Patient not taking: Reported on 11/11/2022) 60 tablet 1     Allergies:     Allergies   Allergen Reactions     Nickel Other (See Comments)     convulsions     Imitrex [Sumatriptan]      Eye pain, stomach pain, headache       Social History:    Chemical dependency: The patient denies any history of treatment for alcohol or drug abuse.n    Family history:  Family History   Problem Relation Age of Onset     Cancer Mother         liver cancer and skin cancer     Hypertension Maternal Grandfather      Asthma No family hx of      C.A.D. No family hx of      Diabetes No family hx of      Cerebrovascular Disease No family hx of      Breast Cancer No family hx of      Cancer - colorectal No family hx of      Hypertension Maternal Uncle      Liver Cancer Mother         s/p liver transplant     Alcoholism Other              Diagnostic Tests:  MRI n/a        Review of Systems:    POSTIVE IN BOLD  GENERAL: fever/chills, fatigue, general unwell feeling, weight gain/loss.  HEAD/EYES:  headache, dizziness, or vision changes.    EARS/NOSE/THROAT:  Nosebleeds, hearing loss, sinus infection, earache, tinnitus.  IMMUNE:  Allergies, cancer, immune deficiency, or infections.  SKIN:  Urticaria, rash, hives  HEME/Lymphatic:   anemia, easy bruising, easy bleeding.  RESPIRATORY:  cough, wheezing, or shortness of breath  CARDIOVASCULAR/Circulation:  Extremity edema, syncope, hypertension, tachycardia, or angina.  GASTROINTESTINAL:  abdominal pain, nausea/emesis, diarrhea, constipation,  hematochezia, or melena.  ENDOCRINE:  Diabetes, steroid use,  thyroid  disease or osteoporosis.  MUSCULOSKELETAL: neck pain, back pain, arthralgia, arthritis, or gout.  GENITOURINARY:  frequency, urgency, dysuria, difficulty voiding, hematuria or incontinence.  NEUROLOGIC:  weakness, numbness, paresthesias, seizure, tremor, stroke or memory loss.  PSYCHIATRIC:  depression, anxiety, stress, suicidal thoughts or mood swings.     Physical Exam:  Vitals:    11/11/22 1331   BP: 120/85   Pulse: 85     Exam:  Constitutional: healthy, alert and no distress  Head: normocephalic. Atraumatic.    Respiratory: speaking in full sentences. No accessory muscles   Skin: no suspicious lesions or rashes  Psychiatric: mentation appears normal and affect normal/bright    Musculoskeletal exam:  Gait/Station/Posture: lying  On table on entiring the room   Antalgic gait  Cervical spine: wnl  Thoracic spine:  Normal   Lumbar spine: decrease  Myofascial tenderness: diffuse  Straight leg exam: +++++++++ on the right   Sacroiliac (SI) joint tenderness: mild  Greater trochanteric tenderness: +++  Piriformis tenderness: neg  ANDRZEJ/FADIR + on the right     Neurologic exam:  Motor:  5/5 UE and LE strength    Reflexes:       Achilles:  R:  2/4 L: 2/4   Sensory:  (upper and lower extremities):   Light touch: normal    Allodynia: absent    Hyperalgesia: absent         Assessment:  Navdeep Sands is a 46 year old male with a past medical history significant for lbp pain and right hip pain presenting with acute progressive RLBP radiating to his medial malleolus       Navdeep was seen today for pain.    Diagnoses and all orders for this visit:    Lumbar radiculopathy  -     MR Lumbar Spine w/o Contrast; Future  -     Physical Therapy Referral; Future  -     gabapentin (NEURONTIN) 100 MG capsule; Take 1 capsule (100 mg) by mouth 3 times daily    Acute bilateral low back pain with right-sided sciatica  -     Spine  Referral            Plan:  Diagnosis reviewed, treatment options addressed, and risks/benefits discussed.  The patient was involved in shared decision making regarding the plan as laid out below.    - Further procedures recommended: will first get imaging   - Medication Management:   - - would start gabapentin low dose 100mg three times a day. 100mg in the pm for 3 days--> add 100mg in the am for 3 days--> 100mg in the afternoon as tolerated. Will continue to titrate as tolerated.    - Physical Therapy: order placed for PHYSICAL THERAPY    - Clinical Health Psychologist to address issues of relaxation, behavioral change, coping style, and other factors important to improvement: no  - Diagnostic Studies: ordered Lumbar MRI   - Urine toxicology screen today: no   - Follow up:    - Will call with results of MRI             The total TIME spent on this patient on the day of the appointment was 30 minutes.   Time spent preparing to see the patient (reviewing records and tests)  Time spend face to face with the patient  Time spent ordering tests, medications, procedures and referrals  Time spent Referring and communicating with other healthcare professionals  Documenting clinical information in Epic  Jose Sharma MD.  Medical Spine Specialist  [facility]    '

## 2022-11-17 ENCOUNTER — ANCILLARY PROCEDURE (OUTPATIENT)
Dept: MRI IMAGING | Facility: CLINIC | Age: 47
End: 2022-11-17
Attending: PAIN MEDICINE
Payer: COMMERCIAL

## 2022-11-17 DIAGNOSIS — M54.16 LUMBAR RADICULOPATHY: ICD-10-CM

## 2022-11-17 PROCEDURE — 72148 MRI LUMBAR SPINE W/O DYE: CPT | Mod: TC | Performed by: RADIOLOGY

## 2022-11-18 ENCOUNTER — TELEPHONE (OUTPATIENT)
Dept: PALLIATIVE MEDICINE | Facility: CLINIC | Age: 47
End: 2022-11-18

## 2022-11-18 NOTE — TELEPHONE ENCOUNTER
Most sig finding is a herniation at L4-5, would be reasonable to preform a ILESI l4-5 if interested with place order.

## 2022-11-22 ENCOUNTER — APPOINTMENT (OUTPATIENT)
Dept: GENERAL RADIOLOGY | Facility: CLINIC | Age: 47
End: 2022-11-22
Attending: PHYSICIAN ASSISTANT
Payer: COMMERCIAL

## 2022-11-22 ENCOUNTER — HOSPITAL ENCOUNTER (EMERGENCY)
Facility: CLINIC | Age: 47
Discharge: HOME | End: 2022-11-22
Attending: PHYSICIAN ASSISTANT | Admitting: PHYSICIAN ASSISTANT
Payer: COMMERCIAL

## 2022-11-22 ENCOUNTER — APPOINTMENT (OUTPATIENT)
Dept: CT IMAGING | Facility: CLINIC | Age: 47
End: 2022-11-22
Attending: PHYSICIAN ASSISTANT
Payer: COMMERCIAL

## 2022-11-22 VITALS
HEART RATE: 74 BPM | RESPIRATION RATE: 16 BRPM | TEMPERATURE: 97.6 F | SYSTOLIC BLOOD PRESSURE: 114 MMHG | OXYGEN SATURATION: 99 % | DIASTOLIC BLOOD PRESSURE: 64 MMHG

## 2022-11-22 DIAGNOSIS — S76.911A STRAIN OF HIP AND THIGH, RIGHT, INITIAL ENCOUNTER: ICD-10-CM

## 2022-11-22 DIAGNOSIS — S46.912A STRAIN OF LEFT SHOULDER, INITIAL ENCOUNTER: ICD-10-CM
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
## 2022-11-22 DIAGNOSIS — S39.012A BACK STRAIN, INITIAL ENCOUNTER: ICD-10-CM

## 2022-11-22 DIAGNOSIS — S76.011A STRAIN OF HIP AND THIGH, RIGHT, INITIAL ENCOUNTER: ICD-10-CM

## 2022-11-22 PROCEDURE — 99284 EMERGENCY DEPT VISIT MOD MDM: CPT | Performed by: PHYSICIAN ASSISTANT

## 2022-11-22 PROCEDURE — 72125 CT NECK SPINE W/O DYE: CPT

## 2022-11-22 PROCEDURE — 70450 CT HEAD/BRAIN W/O DYE: CPT

## 2022-11-22 PROCEDURE — 99285 EMERGENCY DEPT VISIT HI MDM: CPT | Mod: 25 | Performed by: PHYSICIAN ASSISTANT

## 2022-11-22 PROCEDURE — 73030 X-RAY EXAM OF SHOULDER: CPT | Mod: LT

## 2022-11-22 PROCEDURE — 73502 X-RAY EXAM HIP UNI 2-3 VIEWS: CPT

## 2022-11-22 PROCEDURE — 72131 CT LUMBAR SPINE W/O DYE: CPT

## 2022-11-22 PROCEDURE — 72128 CT CHEST SPINE W/O DYE: CPT

## 2022-11-22 PROCEDURE — 96372 THER/PROPH/DIAG INJ SC/IM: CPT | Performed by: PHYSICIAN ASSISTANT

## 2022-11-22 PROCEDURE — 250N000011 HC RX IP 250 OP 636: Performed by: PHYSICIAN ASSISTANT

## 2022-11-22 RX ORDER — KETOROLAC TROMETHAMINE 30 MG/ML
30 INJECTION, SOLUTION INTRAMUSCULAR; INTRAVENOUS ONCE
Status: COMPLETED | OUTPATIENT
Start: 2022-11-22 | End: 2022-11-22

## 2022-11-22 RX ORDER — CYCLOBENZAPRINE HCL 10 MG
10 TABLET ORAL 3 TIMES DAILY PRN
Qty: 20 TABLET | Refills: 0 | Status: SHIPPED | OUTPATIENT
Start: 2022-11-22 | End: 2022-11-28

## 2022-11-22 RX ADMIN — KETOROLAC TROMETHAMINE 30 MG: 30 INJECTION, SOLUTION INTRAMUSCULAR at 12:29

## 2022-11-22 ASSESSMENT — ACTIVITIES OF DAILY LIVING (ADL): ADLS_ACUITY_SCORE: 35

## 2022-11-22 NOTE — DISCHARGE INSTRUCTIONS
All of your imaging was fortunately negative for any broken bones or significant injuries.  I think you just strained some muscles.  Please use ibuprofen 600 mg 3-4 times daily for pain control.  Use the muscle relaxer prescribed as well for pain control and spasm relief.  Apply ice to affected areas and practice gentle stretching.  If you have persistent symptoms you can follow-up with your clinic provider.  If you have any worsening symptoms please return to the emergency department.    Thank you for choosing [facility]. It was a pleasure taking care of you today. If you have any questions, please call [phone].    Sandra Savage PA-C
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
## 2022-12-12 ENCOUNTER — TELEPHONE (OUTPATIENT)
Dept: PALLIATIVE MEDICINE | Facility: CLINIC | Age: 47
End: 2022-12-12

## 2022-12-12 NOTE — TELEPHONE ENCOUNTER
Pt Prefer's Dr Glaser's location/closer to home        Screening Questions for Radiology Injections:    Injection to be done at which interventional clinic site? [facility]    Procedure ordered by     Procedure ordered? L4-5 interlaminar epidural steroid injection     Transforaminal Cervical CHRISTINA - Send to [facility]) - No [facility] providers perform this procedure    What insurance would patient like us to bill for this procedure? [employer]    Worker's comp or MVA (motor vehicle accident) -Any injection DO NOT SCHEDULE and route to Gita Adams.      [employer] insurance - For SI joint injections, DO NOT SCHEDULE and route to Shawna Dunacn.       ALL BCBS, [employer] and [employer] - DO NOT SCHEDULE and route to Shawna Dakota    MEDICA- facet joint injections, route to [employer]    IF SCHEDULING IN [place] PLEASE SCHEDULE AT LEAST 2 WEEKS OUT SO A PA CAN BE OBTAINED    Is an  needed? No     Patient has a  home? (Review Grid) YES: ok    Any chance of pregnancy? NO   If YES, do NOT schedule and route to RN pool  - Dr. Dowd route to Aranza Bob and PM&R Nurse  [[id number]]      Is patient actively being treated for cancer or immunocompromised? No  If YES, do NOT schedule and route to RN pool/ Dr. Dowd's Team    Does the patient have a bleeding or clotting disorder? No     If YES, okay to schedule AND route to RN nurse autumn/ Dr. Dowd's Team     (For any patients with platelet count <100, RN must forward to provider)    Is patient taking any Blood Thinners OR Antiplatelet medication?  No   If hold needed, do NOT schedule, route to RN pool/ Dr. Dowd's Team    Examples:   o Blood Thinners: (Coumadin, Warfarin, Jantoven, Pradaxa, Xarelto, Eliquis, Edoxaban, Enoxaparin, Lovenox, Heparin, Arixtra, Fondaparinux or Fragmin)  o Antiplatelet Medications: (Plavix, Brilinta or Effient)     Is patient taking any aspirin products (includes Excedrin and Fiorinal)? No     If more than  325mg/day, OK to schedule; Instruct Pt to decrease to less than 325 mg for 7 days AND route to RN pool/ Dr. Dowd's Team     For CERVICAL procedures, hold all aspirin products for 6 days.     Tell Pt that if aspirin product is not held for 6 days, the procedure WILL BE cancelled.     Any allergies to contrast dye, iodine, shellfish, or numbing and steroid medications? No    If YES, schedule and add allergy information to appointment notes AND route to the RN pool/ Dr. Dowd's Team    If CHRISTINA and Contrast Dye / Iodine Allergy? DO NOT SCHEDULE, route to RN pool/ Dr. Dowd's Team    Allergies: Nickel and Imitrex [sumatriptan]     Does patient have an active infection or treated for one within the past week? No    Is patient currently taking any antibiotics or steroid medications?  No     For patients on chronic, preventative, or prophylactic antibiotics, procedures may be scheduled.     For patients on antibiotics for active or recent infection, schedule 4 days after completed.    For patients on steroid medications, schedule 4 days after completed.     Has the patient had a flu shot or any other vaccinations within the past 7 days? No  If yes, explain that for the vaccine to work best they need to:       wait 1 week before and 1 week after getting any Vaccine    wait 1 week before and 2 weeks after getting Covid Vaccine #2 or BOOSTER    If patient has concerns about the timing, send to RN pool/ Dr. Dowd's Team    Does patient have an MRI/CT?  YES: MRI Include Date and Check Procedure Scheduling Grid to see if required.    Was the MRI/CT done within the last 3 years?  Yes     If no route to RN Pool/ Dr. Qureshis Team    If yes, where was the MRI/CT done? [facility]     Refer to PACS Transmissions list for approved external locations and route to RN Pool High Priority/ Dr. Dowd's Team    If MRI was not done at approved external location do NOT schedule and route to RN pool/ Dr. Qureshis Team      If patient has  an imaging disc, the injection MAY be scheduled but patient must bring disc to appt or appt will be cancelled.    Is patient able to transfer to a procedure table with minimal or no assistance? Yes     If no, do NOT schedule and route to RN Pool/ Dr. Dowd's Team    Procedure Specific Instructions:    If celiac plexus block, informed patient NPO for 6 hours and that it is okay to take medications with sips of water, especially blood pressure medications Not Applicable         If this is for a cervical procedure, informed patient that aspirin needs to be held for 6 days.   Not Applicable      Sedation, If Sedation is ordered for any procedure, patient must be NPO for 6 hours prior to procedure Not Applicable      If IV needed:    Do not schedule procedures requiring IV placement in the first appointment of the day or first appointment after lunch. Do NOT schedule at 0745, 0815 or 1245. ok    Instructed patient to arrive 30 minutes early for IV start if required. (Check Procedure Scheduling Grid)  Not Applicable    Reminders:    If you are started on any steroids or antibiotics between now and your appointment, you must contact us because the procedure may need to be cancelled.  Yes      As a reminder, receiving steroids can decrease your body's ability to fight infection.   Would you still like to move forward with scheduling the injection?  Yes      IV Sedation is not provided for procedures. If oral anti-anxiety medication is needed, the patient should request this from their referring provider.      Instruct patient to arrive as directed prior to the scheduled appointment time:  If IV needed 30 minutes before appointment time       For patients 85 or older we recommend having an adult stay w/ them for the remainder of the day.       If the patient is Diabetic, remind them to bring their glucometer.      Does the patient have any questions?  NO  Jordyn Adams  [facility]

## 2023-02-03 ENCOUNTER — TELEPHONE (OUTPATIENT)
Dept: FAMILY MEDICINE | Facility: CLINIC | Age: 48
End: 2023-02-03

## 2023-02-03 NOTE — LETTER
[facility]  [address]  [place] [address] [phone]      March 6, 2023    To  Navdeep Sands  [address] [id number]    Your team at [facility] cares about your health. We have reviewed your chart and based on our findings; we are making the following recommendations to better manage your health.     You are in particular need of attention regarding the following:     Call or MyChart message your clinic to schedule a colonoscopy, schedule/ a FIT Test, or order a Cologuard test. If you are unsure what type of test you need, please call your clinic and speak to clinic staff.   Colon cancer is now the second leading cause of cancer-related deaths in the [place] for both men and women and there are over 130,000 new cases and 50,000 deaths per year from colon cancer. Colonoscopies can prevent 90-95% of these deaths. Problem lesions can be removed before they ever become cancer. This test is not only looking for cancer, but also getting rid of precancerous lesions.   If you are under/uninsured, we recommend you contact the [place] Toxic[employer] Program.[facility] is a free colorectal cancer screening program that provides colonoscopies for eligible under/uninsured [place] men and women. If you are interested in receiving a free colonoscopy, please call [facility] at t [phone] (mention code [employer]) to see if you're eligible. Please have them send us the results.   PREVENTATIVE VISIT: Physical    If you have already completed these items, please contact the clinic via phone or   [employer] so your care team can review and update your records. Thank you for   choosing [facility] for your healthcare needs. For any questions,   concerns, or to schedule an appointment please contact our clinic.    Healthy Regards,      Your [facility] Care Team

## 2023-02-03 NOTE — TELEPHONE ENCOUNTER
Patient Quality Outreach    Patient is due for the following:   Colon Cancer Screening  Physical Preventive Adult Physical      Topic Date Due     Hepatitis B Vaccine (1 of 3 - 3-dose series) Never done     COVID-19 Vaccine (1) Never done     Pneumococcal Vaccine (1 - PCV) Never done     Diptheria Tetanus Pertussis (DTAP/TDAP/TD) Vaccine (2 - Td or Tdap) 05/09/2022     Flu Vaccine (1) Never done       Next Steps:   Schedule a Adult Preventative    Type of outreach:    Sent [employer] message.      Questions for provider review:    None     Abby Hartman CMA  Chart routed to Care Team.

## 2023-03-06 NOTE — TELEPHONE ENCOUNTER
Patient Quality Outreach    Patient is due for the following:   Colon Cancer Screening  Physical Preventive Adult Physical      Topic Date Due     Hepatitis B Vaccine (1 of 3 - 3-dose series) Never done     COVID-19 Vaccine (1) Never done     Pneumococcal Vaccine (1 - PCV) Never done     Diptheria Tetanus Pertussis (DTAP/TDAP/TD) Vaccine (2 - Td or Tdap) 05/09/2022     Flu Vaccine (1) Never done       Next Steps:   Schedule a Adult Preventative    Type of outreach:    Sent [employer] message.    Next Steps:  Reach out within 90 days via Letter.    Max number of attempts reached: Yes. Will try again in 90 days if patient still on fail list.    Questions for provider review:    None     Abby Hartman CMA  Chart routed to Care Team.

## 2023-04-23 ENCOUNTER — HEALTH MAINTENANCE LETTER (OUTPATIENT)
Age: 48
End: 2023-04-23

## 2023-06-02 ENCOUNTER — HEALTH MAINTENANCE LETTER (OUTPATIENT)
Age: 48
End: 2023-06-02

## 2023-12-11 ENCOUNTER — HOSPITAL ENCOUNTER (EMERGENCY)
Facility: CLINIC | Age: 48
Discharge: HOME | End: 2023-12-11
Attending: EMERGENCY MEDICINE | Admitting: EMERGENCY MEDICINE
Payer: COMMERCIAL

## 2023-12-11 ENCOUNTER — APPOINTMENT (OUTPATIENT)
Dept: CT IMAGING | Facility: CLINIC | Age: 48
End: 2023-12-11
Attending: EMERGENCY MEDICINE
Payer: COMMERCIAL

## 2023-12-11 VITALS
BODY MASS INDEX: 31.99 KG/M2 | TEMPERATURE: 98.1 F | RESPIRATION RATE: 16 BRPM | SYSTOLIC BLOOD PRESSURE: 153 MMHG | WEIGHT: 228.5 LBS | HEART RATE: 75 BPM | DIASTOLIC BLOOD PRESSURE: 75 MMHG | HEIGHT: 71 IN | OXYGEN SATURATION: 100 %

## 2023-12-11 DIAGNOSIS — K62.5 RECTAL BLEEDING: ICD-10-CM

## 2023-12-11 DIAGNOSIS — R19.7 DIARRHEA, UNSPECIFIED TYPE: ICD-10-CM

## 2023-12-11 DIAGNOSIS — U07.1 COVID-19: ICD-10-CM

## 2023-12-11 LAB
ALBUMIN SERPL BCG-MCNC: 4.5 G/DL (ref 3.5–5.2)
ALP SERPL-CCNC: 99 U/L (ref 40–150)
ALT SERPL W P-5'-P-CCNC: 28 U/L (ref 0–70)
ANION GAP SERPL CALCULATED.3IONS-SCNC: 8 MMOL/L (ref 7–15)
AST SERPL W P-5'-P-CCNC: 19 U/L (ref 0–45)
BASOPHILS # BLD AUTO: 0.1 10E3/UL (ref 0–0.2)
BASOPHILS NFR BLD AUTO: 1 %
BILIRUB SERPL-MCNC: 0.3 MG/DL
BUN SERPL-MCNC: 14.6 MG/DL (ref 6–20)
CALCIUM SERPL-MCNC: 9.3 MG/DL (ref 8.6–10)
CHLORIDE SERPL-SCNC: 103 MMOL/L (ref 98–107)
CREAT SERPL-MCNC: 1.23 MG/DL (ref 0.67–1.17)
DEPRECATED HCO3 PLAS-SCNC: 29 MMOL/L (ref 22–29)
EGFRCR SERPLBLD CKD-EPI 2021: 72 ML/MIN/1.73M2
EOSINOPHIL # BLD AUTO: 0.1 10E3/UL (ref 0–0.7)
EOSINOPHIL NFR BLD AUTO: 1 %
ERYTHROCYTE [DISTWIDTH] IN BLOOD BY AUTOMATED COUNT: 12.8 % (ref 10–15)
FLUAV RNA SPEC QL NAA+PROBE: NEGATIVE
FLUBV RNA RESP QL NAA+PROBE: NEGATIVE
GLUCOSE SERPL-MCNC: 97 MG/DL (ref 70–99)
HCT VFR BLD AUTO: 49.9 % (ref 40–53)
HGB BLD-MCNC: 16.8 G/DL (ref 13.3–17.7)
HOLD SPECIMEN: NORMAL
IMM GRANULOCYTES # BLD: 0.1 10E3/UL
IMM GRANULOCYTES NFR BLD: 1 %
LYMPHOCYTES # BLD AUTO: 3.6 10E3/UL (ref 0.8–5.3)
LYMPHOCYTES NFR BLD AUTO: 31 %
MCH RBC QN AUTO: 31.3 PG (ref 26.5–33)
MCHC RBC AUTO-ENTMCNC: 33.7 G/DL (ref 31.5–36.5)
MCV RBC AUTO: 93 FL (ref 78–100)
MONOCYTES # BLD AUTO: 0.7 10E3/UL (ref 0–1.3)
MONOCYTES NFR BLD AUTO: 6 %
NEUTROPHILS # BLD AUTO: 7.1 10E3/UL (ref 1.6–8.3)
NEUTROPHILS NFR BLD AUTO: 60 %
NRBC # BLD AUTO: 0 10E3/UL
NRBC BLD AUTO-RTO: 0 /100
PLATELET # BLD AUTO: 254 10E3/UL (ref 150–450)
POTASSIUM SERPL-SCNC: 4.1 MMOL/L (ref 3.4–5.3)
PROT SERPL-MCNC: 7.2 G/DL (ref 6.4–8.3)
RBC # BLD AUTO: 5.37 10E6/UL (ref 4.4–5.9)
RSV RNA SPEC NAA+PROBE: NEGATIVE
SARS-COV-2 RNA RESP QL NAA+PROBE: POSITIVE
SODIUM SERPL-SCNC: 140 MMOL/L (ref 135–145)
WBC # BLD AUTO: 11.7 10E3/UL (ref 4–11)

## 2023-12-11 PROCEDURE — 96361 HYDRATE IV INFUSION ADD-ON: CPT | Performed by: EMERGENCY MEDICINE

## 2023-12-11 PROCEDURE — 80053 COMPREHEN METABOLIC PANEL: CPT | Performed by: EMERGENCY MEDICINE

## 2023-12-11 PROCEDURE — 96374 THER/PROPH/DIAG INJ IV PUSH: CPT | Mod: 59 | Performed by: EMERGENCY MEDICINE

## 2023-12-11 PROCEDURE — 96375 TX/PRO/DX INJ NEW DRUG ADDON: CPT | Performed by: EMERGENCY MEDICINE

## 2023-12-11 PROCEDURE — 99284 EMERGENCY DEPT VISIT MOD MDM: CPT | Performed by: EMERGENCY MEDICINE

## 2023-12-11 PROCEDURE — 85025 COMPLETE CBC W/AUTO DIFF WBC: CPT | Performed by: EMERGENCY MEDICINE

## 2023-12-11 PROCEDURE — 99285 EMERGENCY DEPT VISIT HI MDM: CPT | Mod: 25 | Performed by: EMERGENCY MEDICINE

## 2023-12-11 PROCEDURE — 250N000011 HC RX IP 250 OP 636: Performed by: EMERGENCY MEDICINE

## 2023-12-11 PROCEDURE — 250N000009 HC RX 250: Performed by: EMERGENCY MEDICINE

## 2023-12-11 PROCEDURE — 74177 CT ABD & PELVIS W/CONTRAST: CPT

## 2023-12-11 PROCEDURE — 250N000011 HC RX IP 250 OP 636: Mod: JZ | Performed by: EMERGENCY MEDICINE

## 2023-12-11 PROCEDURE — 258N000003 HC RX IP 258 OP 636: Performed by: EMERGENCY MEDICINE

## 2023-12-11 PROCEDURE — 87637 SARSCOV2&INF A&B&RSV AMP PRB: CPT | Performed by: EMERGENCY MEDICINE

## 2023-12-11 PROCEDURE — 36415 COLL VENOUS BLD VENIPUNCTURE: CPT | Performed by: EMERGENCY MEDICINE

## 2023-12-11 RX ORDER — ONDANSETRON 2 MG/ML
4 INJECTION INTRAMUSCULAR; INTRAVENOUS ONCE
Status: COMPLETED | OUTPATIENT
Start: 2023-12-11 | End: 2023-12-11

## 2023-12-11 RX ORDER — KETOROLAC TROMETHAMINE 15 MG/ML
15 INJECTION, SOLUTION INTRAMUSCULAR; INTRAVENOUS ONCE
Status: COMPLETED | OUTPATIENT
Start: 2023-12-11 | End: 2023-12-11

## 2023-12-11 RX ORDER — ONDANSETRON 4 MG/1
4 TABLET, ORALLY DISINTEGRATING ORAL EVERY 8 HOURS PRN
Qty: 10 TABLET | Refills: 0 | Status: SHIPPED | OUTPATIENT
Start: 2023-12-11

## 2023-12-11 RX ORDER — IOPAMIDOL 755 MG/ML
500 INJECTION, SOLUTION INTRAVASCULAR ONCE
Status: COMPLETED | OUTPATIENT
Start: 2023-12-11 | End: 2023-12-11

## 2023-12-11 RX ORDER — MELOXICAM 7.5 MG/1
7.5 TABLET ORAL DAILY PRN
Qty: 15 TABLET | Refills: 0 | Status: SHIPPED | OUTPATIENT
Start: 2023-12-11

## 2023-12-11 RX ADMIN — KETOROLAC TROMETHAMINE 15 MG: 15 INJECTION, SOLUTION INTRAMUSCULAR; INTRAVENOUS at 13:10

## 2023-12-11 RX ADMIN — ONDANSETRON 4 MG: 2 INJECTION INTRAMUSCULAR; INTRAVENOUS at 13:09

## 2023-12-11 RX ADMIN — SODIUM CHLORIDE 60 ML: 9 INJECTION, SOLUTION INTRAVENOUS at 14:05

## 2023-12-11 RX ADMIN — IOPAMIDOL 100 ML: 755 INJECTION, SOLUTION INTRAVENOUS at 14:06

## 2023-12-11 RX ADMIN — SODIUM CHLORIDE 1000 ML: 9 INJECTION, SOLUTION INTRAVENOUS at 13:09

## 2023-12-11 ASSESSMENT — ACTIVITIES OF DAILY LIVING (ADL)
ADLS_ACUITY_SCORE: 35
ADLS_ACUITY_SCORE: 35

## 2023-12-11 NOTE — ED PROVIDER NOTES
History     chief complaint  HPI  Patient is a 47-year-old gentleman without a significant past medical history presenting with about a week of nausea, dry heaving, diarrhea, lower abdominal pain.  There has been some blood in the stool.  Is also had a sore throat nasal congestion, and a headache.  No numbness/tingling/weakness distally.  No dysuria.    Review of Systems:  All organ systems below were reviewed and are negative unless indicated in the HPI.    Constitutional  Eyes  ENT  Respiratory  Cardiovascular  Gastrointestinal  Genitourinary  Musculoskeletal  Skin  Neuro    Allergies:  Allergies   Allergen Reactions    Nickel Other (See Comments)     convulsions    Imitrex [Sumatriptan]      Eye pain, stomach pain, headache       Problem List:    Patient Active Problem List    Diagnosis Date Noted    Obesity, Class I, BMI 30-34.9 10/22/2015     Priority: Medium    CARDIOVASCULAR SCREENING; LDL GOAL LESS THAN 160 03/11/2014     Priority: Medium        Past Medical History:    History reviewed. No pertinent past medical history.    Past Surgical History:    Past Surgical History:   Procedure Laterality Date    ORTHOPEDIC SURGERY      OTHER SURGICAL HISTORY      no prior surgeries       Family History:    Family History   Problem Relation Age of Onset    Cancer Mother         liver cancer and skin cancer    Hypertension Maternal Grandfather     Asthma No family hx of     C.A.D. No family hx of     Diabetes No family hx of     Cerebrovascular Disease No family hx of     Breast Cancer No family hx of     Cancer - colorectal No family hx of     Hypertension Maternal Uncle     Liver Cancer Mother         s/p liver transplant    Alcoholism Other        Medications:    meloxicam (MOBIC) 7.5 MG tablet  ondansetron (ZOFRAN ODT) 4 MG ODT tab  acetaminophen (TYLENOL) 650 MG CR tablet  gabapentin (NEURONTIN) 100 MG capsule  naproxen (NAPROSYN) 500 MG tablet  oxyCODONE (ROXICODONE) 5 MG tablet          Physical Exam   BP:  "(!) 153/75  Pulse: 75  Temp: 98.1  F (36.7  C)  Resp: 16  Height: 180.3 cm (5' 11\")  Weight: 103.6 kg (228 lb 8 oz)  SpO2: 100 %    Gen: Vital signs reviewed  Eyes: Sclera white, pupils round  ENT: External ears and nares normal.  Oropharynx erythematous with slight exudate.  Card: Regular rate and rhythm  Resp: No respiratory distress. Lungs clear to auscultation bilaterally  Abd: Soft, non-distended, lower abdominal tenderness without guarding.  Extremities: Symmetric distal pulses.  Skin: No rashes  Neuro: Alert, speech normal.     ED Course        Procedures             Results for orders placed or performed during the hospital encounter of 12/11/23 (from the past 24 hour(s))   CBC with Platelets & Differential    Narrative    The following orders were created for panel order CBC with Platelets & Differential.  Procedure                               Abnormality         Status                     ---------                               -----------         ------                     CBC with platelets and d...[[id number]]  Abnormal            Final result                 Please view results for these tests on the individual orders.   Comprehensive metabolic panel   Result Value Ref Range    Sodium 140 135 - 145 mmol/L    Potassium 4.1 3.4 - 5.3 mmol/L    Carbon Dioxide (CO2) 29 22 - 29 mmol/L    Anion Gap 8 7 - 15 mmol/L    Urea Nitrogen 14.6 6.0 - 20.0 mg/dL    Creatinine 1.23 (H) 0.67 - 1.17 mg/dL    GFR Estimate 72 >60 mL/min/1.73m2    Calcium 9.3 8.6 - 10.0 mg/dL    Chloride 103 98 - 107 mmol/L    Glucose 97 70 - 99 mg/dL    Alkaline Phosphatase 99 40 - 150 U/L    AST 19 0 - 45 U/L    ALT 28 0 - 70 U/L    Protein Total 7.2 6.4 - 8.3 g/dL    Albumin 4.5 3.5 - 5.2 g/dL    Bilirubin Total 0.3 <=1.2 mg/dL   [place] Draw    Narrative    The following orders were created for panel order [place] Draw.  Procedure                               Abnormality         Status                     ---------                     "           -----------         ------                     Extra Blue Top Tube[[id number]]                              Final result                 Please view results for these tests on the individual orders.   CBC with platelets and differential   Result Value Ref Range    WBC Count 11.7 (H) 4.0 - 11.0 10e3/uL    RBC Count 5.37 4.40 - 5.90 10e6/uL    Hemoglobin 16.8 13.3 - 17.7 g/dL    Hematocrit 49.9 40.0 - 53.0 %    MCV 93 78 - 100 fL    MCH 31.3 26.5 - 33.0 pg    MCHC 33.7 31.5 - 36.5 g/dL    RDW 12.8 10.0 - 15.0 %    Platelet Count 254 150 - 450 10e3/uL    % Neutrophils 60 %    % Lymphocytes 31 %    % Monocytes 6 %    % Eosinophils 1 %    % Basophils 1 %    % Immature Granulocytes 1 %    NRBCs per 100 WBC 0 <1 /100    Absolute Neutrophils 7.1 1.6 - 8.3 10e3/uL    Absolute Lymphocytes 3.6 0.8 - 5.3 10e3/uL    Absolute Monocytes 0.7 0.0 - 1.3 10e3/uL    Absolute Eosinophils 0.1 0.0 - 0.7 10e3/uL    Absolute Basophils 0.1 0.0 - 0.2 10e3/uL    Absolute Immature Granulocytes 0.1 <=0.4 10e3/uL    Absolute NRBCs 0.0 10e3/uL   Extra Blue Top Tube   Result Value Ref Range    Hold Specimen JI    Symptomatic Influenza A/B, RSV, & SARS-CoV2 PCR (COVID-19) Nasopharyngeal    Specimen: Nasopharyngeal; Swab   Result Value Ref Range    Influenza A PCR Negative Negative    Influenza B PCR Negative Negative    RSV PCR Negative Negative    SARS CoV2 PCR Positive (A) Negative    Narrative    Testing was performed using the Xpert Xpress CoV2/Flu/RSV Assay on the [employer] GeneXpert Instrument. This test should be ordered for the detection of SARS-CoV-2, influenza, and RSV viruses in individuals who meet clinical and/or epidemiological criteria. Test performance is unknown in asymptomatic patients. This test is for in vitro diagnostic use under the FDA EUA for laboratories certified under CLIA to perform high or moderate complexity testing. This test has not been FDA cleared or approved. A negative result does not rule out the presence  of PCR inhibitors in the specimen or target RNA in concentration below the limit of detection for the assay. If only one viral target is positive but coinfection with multiple targets is suspected, the sample should be re-tested with another FDA cleared, approved, or authorized test, if coinfection would change clinical management. This test was validated by the [facility]. These laboratories are certified under the Clinical Laboratory Improvement Amendments of 1988 (CLIA-88) as qualified to perform high complexity laboratory testing.   CT Abdomen Pelvis w Contrast    Narrative    CT ABDOMEN PELVIS WITH CONTRAST 12/11/2023 2:20 PM    CLINICAL HISTORY: Lower abdominal pain, bleeding.    TECHNIQUE: CT scan of the abdomen and pelvis was performed following  injection of IV contrast. Multiplanar reformats were obtained. Dose  reduction techniques were used.  CONTRAST: Isovue-370 100mL    COMPARISON: None.    FINDINGS:   LOWER CHEST: Normal.    HEPATOBILIARY: Tiny hepatic cysts.    PANCREAS: Normal.    SPLEEN: Normal.    ADRENAL GLANDS: Normal.    KIDNEYS/BLADDER: Normal.    BOWEL: Normal appendix. No obstruction or inflammatory changes.    PELVIC ORGANS: Normal.    ADDITIONAL FINDINGS: None.    MUSCULOSKELETAL: Normal.      Impression    IMPRESSION: Negative CT of the abdomen and pelvis. No signs of  colitis, diverticulitis, or other acute inflammatory process in the  abdomen or pelvis.        Medications   sodium chloride 0.9% BOLUS 1,000 mL (0 mLs Intravenous Stopped 12/11/23 1450)   ondansetron (ZOFRAN) injection 4 mg (4 mg Intravenous $Given 12/11/23 1309)   ketorolac (TORADOL) injection 15 mg (15 mg Intravenous $Given 12/11/23 1310)   Saline 100mL Bag (60 mLs Intravenous $Given 12/11/23 1405)   iopamidol (ISOVUE-370) solution 500 mL (100 mLs Intravenous $Given 12/11/23 1406)         Consultations:  None    Social Determinants of Health:  Manages ADLs    Assessments & Plan (with Medical Decision  Making)       I have reviewed the nursing notes.    I have reviewed the findings, diagnosis, plan and need for follow up with the patient.      Medical Decision Making  On arrival, patient slightly hypertensive.  Differential is presentation includes viral syndrome, diverticulitis, appendicitis, colitis.  Laboratory workup shows mild MEAGAN and mild leukocytosis.  CT scan ordered.  Viral swab ordered.  This was positive for COVID-19.  His CT scan looks reassuring.  At this point I do think his symptoms are due to COVID-19 infection.  I prescribed him Zofran and meloxicam for his headache and abdominal pain.  It is lower abdominal pain and I do not suspect an upper GI bleed.  Patient does not have a primary care doctor.  He would like a referral for this for consideration of colonoscopy when this resolves.  Primary care referral placed.  He is over 5 days post onset of illness.  Do not think he would benefit from Paxlovid.  He is discharged home in stable condition with appropriate turn precautions.    Final diagnoses:   COVID-19   Rectal bleeding   Diarrhea, unspecified type         Harshil Farooq M.D.   [facility] Emergency Department     Harshil Farooq MD  12/11/23 0754

## 2023-12-11 NOTE — ED TRIAGE NOTES
Pt reports abdominal pain and blood in stool that started last week, he reports some nausea today as well

## 2023-12-11 NOTE — DISCHARGE INSTRUCTIONS
Use the Zofran to allow yourself to hydrate.  Use the meloxicam for your headaches and abdominal pain. Return if your bleeding worsens.

## 2023-12-11 NOTE — ED NOTES
Patient notified of NPO status. He was drinking root beer upon rooming. Informed NOT to drink any more. Linda Luna RN

## 2024-06-30 ENCOUNTER — HEALTH MAINTENANCE LETTER (OUTPATIENT)
Age: 49
End: 2024-06-30

## 2025-05-12 ENCOUNTER — HOSPITAL ENCOUNTER (EMERGENCY)
Facility: CLINIC | Age: 50
Discharge: HOME | End: 2025-05-12
Attending: STUDENT IN AN ORGANIZED HEALTH CARE EDUCATION/TRAINING PROGRAM | Admitting: STUDENT IN AN ORGANIZED HEALTH CARE EDUCATION/TRAINING PROGRAM
Payer: COMMERCIAL

## 2025-05-12 ENCOUNTER — APPOINTMENT (OUTPATIENT)
Dept: CT IMAGING | Facility: CLINIC | Age: 50
End: 2025-05-12
Attending: STUDENT IN AN ORGANIZED HEALTH CARE EDUCATION/TRAINING PROGRAM
Payer: COMMERCIAL

## 2025-05-12 VITALS
RESPIRATION RATE: 19 BRPM | OXYGEN SATURATION: 95 % | HEART RATE: 67 BPM | HEIGHT: 71 IN | DIASTOLIC BLOOD PRESSURE: 85 MMHG | WEIGHT: 225.8 LBS | SYSTOLIC BLOOD PRESSURE: 110 MMHG | TEMPERATURE: 97.6 F | BODY MASS INDEX: 31.61 KG/M2

## 2025-05-12 DIAGNOSIS — R09.A2 GLOBUS SENSATION: ICD-10-CM

## 2025-05-12 DIAGNOSIS — R07.89 CHEST DISCOMFORT: ICD-10-CM

## 2025-05-12 DIAGNOSIS — F17.200 TOBACCO DEPENDENCE: ICD-10-CM

## 2025-05-12 LAB
ANION GAP SERPL CALCULATED.3IONS-SCNC: 10 MMOL/L (ref 7–15)
ATRIAL RATE - MUSE: 70 BPM
BASOPHILS # BLD AUTO: 0 10E3/UL (ref 0–0.2)
BASOPHILS NFR BLD AUTO: 0 %
BUN SERPL-MCNC: 13.9 MG/DL (ref 6–20)
CALCIUM SERPL-MCNC: 9 MG/DL (ref 8.8–10.4)
CHLORIDE SERPL-SCNC: 107 MMOL/L (ref 98–107)
CREAT SERPL-MCNC: 1.1 MG/DL (ref 0.67–1.17)
D DIMER PPP FEU-MCNC: <0.27 UG/ML FEU (ref 0–0.5)
DIASTOLIC BLOOD PRESSURE - MUSE: NORMAL MMHG
EGFRCR SERPLBLD CKD-EPI 2021: 81 ML/MIN/1.73M2
EOSINOPHIL # BLD AUTO: 0.1 10E3/UL (ref 0–0.7)
EOSINOPHIL NFR BLD AUTO: 1 %
ERYTHROCYTE [DISTWIDTH] IN BLOOD BY AUTOMATED COUNT: 13 % (ref 10–15)
GLUCOSE SERPL-MCNC: 115 MG/DL (ref 70–99)
HCO3 SERPL-SCNC: 23 MMOL/L (ref 22–29)
HCT VFR BLD AUTO: 46.1 % (ref 40–53)
HGB BLD-MCNC: 16.1 G/DL (ref 13.3–17.7)
HOLD SPECIMEN: NORMAL
IMM GRANULOCYTES # BLD: 0.1 10E3/UL
IMM GRANULOCYTES NFR BLD: 1 %
INTERPRETATION ECG - MUSE: NORMAL
LYMPHOCYTES # BLD AUTO: 1.7 10E3/UL (ref 0.8–5.3)
LYMPHOCYTES NFR BLD AUTO: 17 %
MAGNESIUM SERPL-MCNC: 2.2 MG/DL (ref 1.7–2.3)
MCH RBC QN AUTO: 31.3 PG (ref 26.5–33)
MCHC RBC AUTO-ENTMCNC: 34.9 G/DL (ref 31.5–36.5)
MCV RBC AUTO: 90 FL (ref 78–100)
MONOCYTES # BLD AUTO: 0.5 10E3/UL (ref 0–1.3)
MONOCYTES NFR BLD AUTO: 5 %
NEUTROPHILS # BLD AUTO: 7.2 10E3/UL (ref 1.6–8.3)
NEUTROPHILS NFR BLD AUTO: 75 %
NRBC # BLD AUTO: 0 10E3/UL
NRBC BLD AUTO-RTO: 0 /100
NT-PROBNP SERPL-MCNC: 61 PG/ML (ref 0–138)
P AXIS - MUSE: 27 DEGREES
PLATELET # BLD AUTO: 207 10E3/UL (ref 150–450)
POTASSIUM SERPL-SCNC: 3.8 MMOL/L (ref 3.4–5.3)
PR INTERVAL - MUSE: 122 MS
QRS DURATION - MUSE: 100 MS
QT - MUSE: 402 MS
QTC - MUSE: 434 MS
R AXIS - MUSE: 55 DEGREES
RBC # BLD AUTO: 5.15 10E6/UL (ref 4.4–5.9)
S PYO DNA THROAT QL NAA+PROBE: NOT DETECTED
SODIUM SERPL-SCNC: 140 MMOL/L (ref 135–145)
SYSTOLIC BLOOD PRESSURE - MUSE: NORMAL MMHG
T AXIS - MUSE: 37 DEGREES
TROPONIN T SERPL HS-MCNC: 6 NG/L
TROPONIN T SERPL HS-MCNC: 7 NG/L
VENTRICULAR RATE- MUSE: 70 BPM
WBC # BLD AUTO: 9.6 10E3/UL (ref 4–11)

## 2025-05-12 PROCEDURE — 250N000009 HC RX 250: Performed by: STUDENT IN AN ORGANIZED HEALTH CARE EDUCATION/TRAINING PROGRAM

## 2025-05-12 PROCEDURE — 93005 ELECTROCARDIOGRAM TRACING: CPT | Performed by: STUDENT IN AN ORGANIZED HEALTH CARE EDUCATION/TRAINING PROGRAM

## 2025-05-12 PROCEDURE — 70491 CT SOFT TISSUE NECK W/DYE: CPT

## 2025-05-12 PROCEDURE — 83735 ASSAY OF MAGNESIUM: CPT | Performed by: STUDENT IN AN ORGANIZED HEALTH CARE EDUCATION/TRAINING PROGRAM

## 2025-05-12 PROCEDURE — 80048 BASIC METABOLIC PNL TOTAL CA: CPT | Performed by: STUDENT IN AN ORGANIZED HEALTH CARE EDUCATION/TRAINING PROGRAM

## 2025-05-12 PROCEDURE — 84484 ASSAY OF TROPONIN QUANT: CPT | Performed by: STUDENT IN AN ORGANIZED HEALTH CARE EDUCATION/TRAINING PROGRAM

## 2025-05-12 PROCEDURE — 87651 STREP A DNA AMP PROBE: CPT | Performed by: STUDENT IN AN ORGANIZED HEALTH CARE EDUCATION/TRAINING PROGRAM

## 2025-05-12 PROCEDURE — 93010 ELECTROCARDIOGRAM REPORT: CPT | Performed by: STUDENT IN AN ORGANIZED HEALTH CARE EDUCATION/TRAINING PROGRAM

## 2025-05-12 PROCEDURE — 85379 FIBRIN DEGRADATION QUANT: CPT | Performed by: STUDENT IN AN ORGANIZED HEALTH CARE EDUCATION/TRAINING PROGRAM

## 2025-05-12 PROCEDURE — 85004 AUTOMATED DIFF WBC COUNT: CPT | Performed by: STUDENT IN AN ORGANIZED HEALTH CARE EDUCATION/TRAINING PROGRAM

## 2025-05-12 PROCEDURE — 99285 EMERGENCY DEPT VISIT HI MDM: CPT | Performed by: STUDENT IN AN ORGANIZED HEALTH CARE EDUCATION/TRAINING PROGRAM

## 2025-05-12 PROCEDURE — 99285 EMERGENCY DEPT VISIT HI MDM: CPT | Mod: 25 | Performed by: STUDENT IN AN ORGANIZED HEALTH CARE EDUCATION/TRAINING PROGRAM

## 2025-05-12 PROCEDURE — 250N000011 HC RX IP 250 OP 636: Performed by: STUDENT IN AN ORGANIZED HEALTH CARE EDUCATION/TRAINING PROGRAM

## 2025-05-12 PROCEDURE — 83880 ASSAY OF NATRIURETIC PEPTIDE: CPT | Performed by: STUDENT IN AN ORGANIZED HEALTH CARE EDUCATION/TRAINING PROGRAM

## 2025-05-12 PROCEDURE — 36415 COLL VENOUS BLD VENIPUNCTURE: CPT | Performed by: STUDENT IN AN ORGANIZED HEALTH CARE EDUCATION/TRAINING PROGRAM

## 2025-05-12 RX ORDER — MAGNESIUM SULFATE 1 G/100ML
1 INJECTION INTRAVENOUS ONCE
Status: DISCONTINUED | OUTPATIENT
Start: 2025-05-12 | End: 2025-05-12

## 2025-05-12 RX ORDER — IOPAMIDOL 755 MG/ML
500 INJECTION, SOLUTION INTRAVASCULAR ONCE
Status: COMPLETED | OUTPATIENT
Start: 2025-05-12 | End: 2025-05-12

## 2025-05-12 RX ADMIN — SODIUM CHLORIDE 70 ML: 9 INJECTION, SOLUTION INTRAVENOUS at 09:50

## 2025-05-12 RX ADMIN — IOPAMIDOL 90 ML: 755 INJECTION, SOLUTION INTRAVENOUS at 09:51

## 2025-05-12 ASSESSMENT — COLUMBIA-SUICIDE SEVERITY RATING SCALE - C-SSRS
2. HAVE YOU ACTUALLY HAD ANY THOUGHTS OF KILLING YOURSELF IN THE PAST MONTH?: NO
1. IN THE PAST MONTH, HAVE YOU WISHED YOU WERE DEAD OR WISHED YOU COULD GO TO SLEEP AND NOT WAKE UP?: NO
6. HAVE YOU EVER DONE ANYTHING, STARTED TO DO ANYTHING, OR PREPARED TO DO ANYTHING TO END YOUR LIFE?: NO

## 2025-05-12 ASSESSMENT — ACTIVITIES OF DAILY LIVING (ADL)
ADLS_ACUITY_SCORE: 41

## 2025-05-12 NOTE — ED PROVIDER NOTES
History     Chief Complaint   Patient presents with    Chest Pain    throat issue     HPI  Navdeep Sands is a 49 year old male who presents with complaints of left-sided chest discomfort that started last night it has been intermittent and now is rated about a 6 out of 10.  Has not had a cough cold congestion shortness of breath but does note he is been sweating intermittently but is been outside as well.  He is also not had any nausea.  He has no noted cardiac history.  He also notes that he is got a lump in his throat and about the base of it is not given difficulties with swallowing breathing or speaking but notes that he is concerned about malignancy due to his extensive history of smoking.    Allergies:  Allergies   Allergen Reactions    Nickel Other (See Comments)     convulsions    Imitrex [Sumatriptan]      Eye pain, stomach pain, headache       Problem List:    Patient Active Problem List    Diagnosis Date Noted    Obesity, Class I, BMI 30-34.9 10/22/2015     Priority: Medium    CARDIOVASCULAR SCREENING; LDL GOAL LESS THAN 160 03/11/2014     Priority: Medium        Past Medical History:    No past medical history on file.    Past Surgical History:    Past Surgical History:   Procedure Laterality Date    ORTHOPEDIC SURGERY      OTHER SURGICAL HISTORY      no prior surgeries       Family History:    Family History   Problem Relation Age of Onset    Cancer Mother         liver cancer and skin cancer    Hypertension Maternal Grandfather     Asthma No family hx of     C.A.D. No family hx of     Diabetes No family hx of     Cerebrovascular Disease No family hx of     Breast Cancer No family hx of     Cancer - colorectal No family hx of     Hypertension Maternal Uncle     Liver Cancer Mother         s/p liver transplant    Alcoholism Other        Social History:  Marital Status:  Single [1]  Social History     Tobacco Use    Smoking status: Every Day     Current packs/day: 0.50     Average packs/day: 0.5  "packs/day for 22.0 years (11.0 ttl pk-yrs)     Types: Cigarettes    Smokeless tobacco: Never   Vaping Use    Vaping status: Every Day   Substance Use Topics    Alcohol use: No    Drug use: Yes     Types: Marijuana     Comment: occ        Medications:    acetaminophen (TYLENOL) 650 MG CR tablet  gabapentin (NEURONTIN) 100 MG capsule  meloxicam (MOBIC) 7.5 MG tablet  naproxen (NAPROSYN) 500 MG tablet  ondansetron (ZOFRAN ODT) 4 MG ODT tab  oxyCODONE (ROXICODONE) 5 MG tablet          Review of Systems    Physical Exam   BP: (!) 151/105  Pulse: 82  Temp: 97.6  F (36.4  C)  Resp: 27  Height: 180.3 cm (5' 11\")  Weight: 102.4 kg (225 lb 12.8 oz)  SpO2: 100 %      Physical Exam    ED Course        Procedures         KG shows a unremarkable self interpretation at 847 ventricular rate was at 70 normal sinus rhythm IA interval 122  QTc of 434.  No axis deviation incomplete right bundle branch initiating no signs of ischemia.  Similar to previous     Results for orders placed or performed during the hospital encounter of 05/12/25 (from the past 24 hours)   EKG 12 lead   Result Value Ref Range    Systolic Blood Pressure  mmHg    Diastolic Blood Pressure  mmHg    Ventricular Rate 70 BPM    Atrial Rate 70 BPM    IA Interval 122 ms    QRS Duration 100 ms     ms    QTc 434 ms    P Axis 27 degrees    R AXIS 55 degrees    T Axis 37 degrees    Interpretation ECG       Sinus rhythm  Normal ECG  No previous ECGs available  Confirmed by SEE ED PROVIDER NOTE FOR, ECG INTERPRETATION ([id number]),  Juan Antonio Iqbal ([id number]) on 5/12/2025 9:08:28 AM     CBC with Platelets & Differential    Narrative    The following orders were created for panel order CBC with Platelets & Differential.  Procedure                               Abnormality         Status                     ---------                               -----------         ------                     CBC with platelets and ...[[id number]]                      Final result        "          Please view results for these tests on the individual orders.   Basic metabolic panel   Result Value Ref Range    Sodium 140 135 - 145 mmol/L    Potassium 3.8 3.4 - 5.3 mmol/L    Chloride 107 98 - 107 mmol/L    Carbon Dioxide (CO2) 23 22 - 29 mmol/L    Anion Gap 10 7 - 15 mmol/L    Urea Nitrogen 13.9 6.0 - 20.0 mg/dL    Creatinine 1.10 0.67 - 1.17 mg/dL    GFR Estimate 81 >60 mL/min/1.73m2    Calcium 9.0 8.8 - 10.4 mg/dL    Glucose 115 (H) 70 - 99 mg/dL   Troponin T, High Sensitivity   Result Value Ref Range    Troponin T, High Sensitivity 7 <=22 ng/L   [place] Draw    Narrative    The following orders were created for panel order [place] Draw.  Procedure                               Abnormality         Status                     ---------                               -----------         ------                     Extra Blue Top Tube[[id number]]                             Final result                 Please view results for these tests on the individual orders.   CBC with platelets and differential   Result Value Ref Range    WBC Count 9.6 4.0 - 11.0 10e3/uL    RBC Count 5.15 4.40 - 5.90 10e6/uL    Hemoglobin 16.1 13.3 - 17.7 g/dL    Hematocrit 46.1 40.0 - 53.0 %    MCV 90 78 - 100 fL    MCH 31.3 26.5 - 33.0 pg    MCHC 34.9 31.5 - 36.5 g/dL    RDW 13.0 10.0 - 15.0 %    Platelet Count 207 150 - 450 10e3/uL    % Neutrophils 75 %    % Lymphocytes 17 %    % Monocytes 5 %    % Eosinophils 1 %    % Basophils 0 %    % Immature Granulocytes 1 %    NRBCs per 100 WBC 0 <1 /100    Absolute Neutrophils 7.2 1.6 - 8.3 10e3/uL    Absolute Lymphocytes 1.7 0.8 - 5.3 10e3/uL    Absolute Monocytes 0.5 0.0 - 1.3 10e3/uL    Absolute Eosinophils 0.1 0.0 - 0.7 10e3/uL    Absolute Basophils 0.0 0.0 - 0.2 10e3/uL    Absolute Immature Granulocytes 0.1 <=0.4 10e3/uL    Absolute NRBCs 0.0 10e3/uL   Extra Blue Top Tube   Result Value Ref Range    Hold Specimen JIC    Magnesium   Result Value Ref Range    Magnesium 2.2 1.7 - 2.3  mg/dL   D dimer quantitative   Result Value Ref Range    D-Dimer Quantitative <0.27 0.00 - 0.50 ug/mL FEU    Narrative    This D-dimer assay is intended for use in conjunction with a clinical pretest probability assessment model to exclude pulmonary embolism (PE) and deep venous thrombosis (DVT) in outpatients suspected of PE or DVT. The cut-off value is 0.50 ug/mL FEU.    For patients 50 years of age or older, the application of age-adjusted cut-off values for D-Dimer may increase the specificity without significant effect on sensitivity. The literature suggested calculation age adjusted cut-off in ug/L = age in years x 10 ug/L. The results in this laboratory are reported as ug/mL rather than ug/L. The calculation for age adjusted cut off in ug/mL= age in years x 0.01 ug/mL. For example, the cut off for a 76 year old male is 76 x 0.01 ug/mL = 0.76 ug/mL (760 ug/L).    M Alan et al. Age adjusted D-dimer cut-off levels to rule out pulmonary embolism: The ADJUST-PE Study. RUSLAN 2014;311:2510-6916.; HJ Dionna et al. Diagnostic accuracy of conventional or age adjusted D-dimer cutoff values in older patients with suspected venous thromboembolism. Systemic review and meta-analysis. BMJ 2013:346:f2492.   NT-proBNP   Result Value Ref Range    NT-proBNP 61 0 - 138 pg/mL   Group A Streptococcus PCR Throat Swab    Specimen: Throat; Swab   Result Value Ref Range    Group A strep by PCR Not Detected Not Detected    Narrative    The Xpert Xpress Strep A test, performed on the [employer], is a rapid, qualitative in vitro diagnostic test for the detection of Streptococcus pyogenes (Group A ß-hemolytic Streptococcus, Strep A) in throat swab specimens from patients with signs and symptoms of pharyngitis. The Xpert Xpress Strep A test can be used as an aid in the diagnosis of Group A Streptococcal pharyngitis. The assay is not intended to monitor treatment for Group A Streptococcus infections. The Xpert Xpress  Strep A test utilizes an automated real-time polymerase chain reaction (PCR) to detect Streptococcus pyogenes DNA.   Soft tissue neck CT w contrast    Narrative    EXAM: CT SOFT TISSUE NECK W CONTRAST  LOCATION: [facility]  DATE: 5/12/2025    INDICATION: Smoker feeling of globus sensation base of neck throat.  COMPARISON: None.  CONTRAST: 90mL, Isovue 370.  TECHNIQUE: Routine CT Soft Tissue Neck with IV contrast. Multiplanar reformats. Dose reduction techniques were used.    FINDINGS:     MUCOSAL SPACES/SOFT TISSUES: There is an ovoid fluid density nodule in the subcutaneous tissues of the lateral left cheek measuring 2.5 cm long axis that may represent a large sebaceous cyst. Normal mucosal spaces of the upper aerodigestive tract. No   mucosal mass or inflammation identified. Normal vocal cords and infraglottic trachea. Normal parapharyngeal space. Normal oral cavity,  spaces, and floor of mouth structures.    LYMPH NODES: No pathologic lymph nodes by size or morphology criteria.     SALIVARY GLANDS: Normal parotid and submandibular glands.    THYROID: Normal.     VESSELS: Vascular structures of the neck are patent.    VISUALIZED INTRACRANIAL/ORBITS/SINUSES: No abnormality of the visualized intracranial compartment or orbits. Visualized paranasal sinuses and mastoid air cells are clear.    OTHER: No destructive osseous lesion. The included lung apices are clear.      Impression    IMPRESSION:   1.  Possible sebaceous cyst in the lateral left cheek measuring 2.5 cm.  2.  Otherwise, normal soft tissue neck CT. Specifically, no findings to suggest mucosal space lesion.   Troponin T, High Sensitivity   Result Value Ref Range    Troponin T, High Sensitivity 6 <=22 ng/L       Medications   iopamidol (ISOVUE-370) solution 500 mL (90 mLs Intravenous $Given 5/12/25 2466)   sodium chloride 0.9 % bag for CT scan flush (70 mLs Intravenous $Given 5/12/25 0334)       Assessments & Plan  (with Medical Decision Making)     I have reviewed the nursing notes.    I have reviewed the findings, diagnosis, plan and need for follow up with the patient.  \    Medical Decision Making  Navdeep Sands is a 49 year old male who presents with complaints of left-sided chest discomfort that started last night it has been intermittent and now is rated about a 6 out of 10.  Has not had a cough cold congestion shortness of breath but does note he is been sweating intermittently but is been outside as well.  He is also not had any nausea.  He has no noted cardiac history.  He also notes that he is got a lump in his throat and about the base of it is not given difficulties with swallowing breathing or speaking but notes that he is concerned about malignancy due to his extensive history of smoking.    Patient's vitals are reassuring with a blood pressure 151/105 which is mildly hypertensive with a temperature of 97.6 pulse of 82 and oxygen saturation 100% on room air.  He is in no acute distress.  He is otherwise pleasant and able to provide history with no signs of respiratory distress.  His lungs are clear bilaterally with no wheezing or rales or decreased air entry.  There is no signs of acute trauma to the chest.  His cardiac heart sounds are unremarkable.  His pulses are equal bilaterally which is reassuring for any obvious aortic deficit or defect contributing patient symptomatology.  His EKG was reassuring with no obvious signs of ischemia.  There is no palpable masses across the neck examination he is got clear sounds on auscultation no bruits or stridor.  His oropharynx is unremarkable on examination.    Lab work is reassuring with a BNP of 61 and a troponin of 7 and unremarkable EKG for arrhythmias or ischemia.  His magnesium at 2.2 and sodium potassium unremarkable.  BMP unremarkable and a CBC with no significant leukocytosis and hemoglobin 16.1.  Strep negative.  CT imaging of the neck is unremarkable for any  acute signs of masses.  Small cystic structure in the left cheek discussed outpatient follow-up with primary care doctor to have evaluated if necessary.  We discussed continued observation and outpatient follow-up for considerations of endoscopy if necessary.  Repeat troponin with no significant change.  Discussed outpatient follow-up however do not see any ischemic changes resulting in patient's mild chest discomfort could be more most MSK related and with patient's globus sensation could be just more so associated with esophageal spasming however important outpatient follow-up for this would be appropriate due to patient's past medical history.  Patient otherwise stable for discharge home with no other acute medical concerns at this time.      New Prescriptions    No medications on file       Final diagnoses:   Globus sensation   Chest discomfort   Tobacco dependence       5/12/2025   [facility] EMERGENCY DEPT       Reva Montejo MD  05/12/25 [id number]

## 2025-05-12 NOTE — ED TRIAGE NOTES
PT presents for concerns of Mid-sternal Chest pressure discomfort that started last night, rates it as 6/10 pain score. PT also with concerns of a lump inside his throat that has been going on for the last couple weeks.

## 2025-05-12 NOTE — DISCHARGE INSTRUCTIONS
Your CT imaging not show any masses or growths however endoscopy is the gold standard to evaluate for any acute concerns of globus sensation however cannot see anything acutely today to pursue any further imaging your lab work was also reassuring with no signs of ischemia of the heart with reassuring troponins and your EKG was also unremarkable for signs of ischemia or heart failure.  Your lungs were clear bilaterally if you start to feel like you are wheezing then imaging can be pursued at that time.  Otherwise follow-up primary care in the next 2 to 3 days for reevaluation.

## 2025-05-13 ENCOUNTER — OFFICE VISIT (OUTPATIENT)
Dept: FAMILY MEDICINE | Facility: CLINIC | Age: 50
End: 2025-05-13
Payer: COMMERCIAL

## 2025-05-13 VITALS
BODY MASS INDEX: 31.44 KG/M2 | OXYGEN SATURATION: 98 % | HEART RATE: 77 BPM | WEIGHT: 225.4 LBS | DIASTOLIC BLOOD PRESSURE: 60 MMHG | SYSTOLIC BLOOD PRESSURE: 90 MMHG

## 2025-05-13 DIAGNOSIS — R07.9 LEFT-SIDED CHEST PAIN: ICD-10-CM

## 2025-05-13 DIAGNOSIS — R09.A2 GLOBUS SENSATION: Primary | ICD-10-CM

## 2025-05-13 DIAGNOSIS — Z80.9 FAMILY HISTORY OF CANCER: ICD-10-CM

## 2025-05-13 DIAGNOSIS — F17.200 CURRENT SMOKER: ICD-10-CM

## 2025-05-13 DIAGNOSIS — R13.10 DYSPHAGIA, UNSPECIFIED TYPE: ICD-10-CM

## 2025-05-13 DIAGNOSIS — H90.3 SENSORINEURAL HEARING LOSS, BILATERAL: ICD-10-CM

## 2025-05-13 PROCEDURE — 99203 OFFICE O/P NEW LOW 30 MIN: CPT | Performed by: NURSE PRACTITIONER

## 2025-05-13 PROCEDURE — G2211 COMPLEX E/M VISIT ADD ON: HCPCS | Performed by: NURSE PRACTITIONER

## 2025-05-13 PROCEDURE — 3074F SYST BP LT 130 MM HG: CPT | Performed by: NURSE PRACTITIONER

## 2025-05-13 PROCEDURE — 3078F DIAST BP <80 MM HG: CPT | Performed by: NURSE PRACTITIONER

## 2025-05-13 ASSESSMENT — ENCOUNTER SYMPTOMS: SORE THROAT: 1

## 2025-05-13 NOTE — PROGRESS NOTES
Assessment & Plan     Globus sensation  - Adult GI  Referral - Procedure Only; Future    Dysphagia, unspecified type  - Adult GI  Referral - Procedure Only; Future    Left-sided chest pain    Current smoker  - Adult GI  Referral - Procedure Only; Future    Family history of cancer    Navdeep presents to clinic today for ED follow-up after being seen for concerns of globus sensation and difficulty swallowing, feeling as though what he swallows becomes stuck in the esophagus. His ED work-up today was reviewed without significant findings. He ultimately would like to have an EGD completed to further evaluate the esophagus and throat. We discussed with his symptoms and normal work-up thus far, this is reasonable. EGD orders placed. He is due for colon cancer screening as well and orders were placed this for this as well. He has significant concern given current smoking status and family history of cancer with his mother, although he is not specific to what kind of cancer she has had. He would like to establish care with a PCP, this has been arranged for him today. He voiced concern of scheduling as he has difficulty talking on the phone with his hearing loss. Discussed using [employer] for written communication as this may be more helpful for him. Nursing staff to help with access. He will follow-up with PCP, and complete imaging as scheduled. Follow-up sooner with new or worsening symptoms, questions or concerns.     Nicotine/Tobacco Cessation  He reports that he has been smoking cigarettes. He has a 11 pack-year smoking history. He has never used smokeless tobacco.  Nicotine/Tobacco Cessation Plan  Information offered: Patient not interested at this time    I explained my diagnostic considerations and recommendations to the patient, who voiced understanding and agreement with the assessment and treatment plan. All questions were answered to patient's apparent satisfaction. We discussed  "potential side effects of any prescribed or recommended therapies, as well as expectations for response to treatments and importance of lifestyle measures that may improve symptoms. Patient was advised to contact our office if there are new symptoms or no improvement or worsening of conditions or symptoms.    The longitudinal plan of care for the diagnosis(es)/condition(s) as documented were addressed during this visit. Due to the added complexity in care, I will continue to support Navdeep in the subsequent management and with ongoing continuity of care.        Subjective   Navdeep is a 49 year old, presenting for the following health issues:  Pharyngitis and Hypertension (Was seen in er/ )      5/13/2025     3:12 PM   Additional Questions   Roomed by Tracie Rowan         Navdeep was seen in the [facility] ED yesterday for concerns of left sided chest discomfort as well as a sensation of a lump in his throat. He had rather extensive work-up in the ED including normal exam. Lab work-up was reassuring with BNP of 61, troponin was 7 and EKG in normal sinus rhythm. CMP was unremarkable, CBC without any leukocytosis. Strep test was negative. CT of the neck was unremarkable for any acute masses, small cyst seen in the left cheek was only finding. He was encouraged to schedule outpatient follow-up.     Today, Navdeep states he continues to have symptoms of difficulty swallowing, feeling as though what he swallows \"gets stuck\" after swallowing. This is particularly difficult with pills. He voices concern of cancer in particular. He states his mother has had cancer several times. He reports additional family history of cancer as well in other family members. He is a former smoker. He notes concerns with difficulty hearing, progressive hearing loss as well. He has followed with Audiology in the past.     Patient Active Problem List   Diagnosis    CARDIOVASCULAR SCREENING; LDL GOAL LESS THAN 160    Obesity, Class I, " BMI 30-34.9     No current outpatient medications on file.     No current facility-administered medications for this visit.                 Review of Systems  Constitutional, HEENT, cardiovascular, pulmonary, gi and gu systems are negative, except as otherwise noted.      Objective    BP 90/60   Pulse 77   Wt 102.2 kg (225 lb 6.4 oz)   SpO2 98%   BMI 31.44 kg/m    Body mass index is 31.44 kg/m .  Physical Exam  Vitals reviewed.   Constitutional:       General: He is not in acute distress.     Appearance: Normal appearance.   Eyes:      Extraocular Movements: Extraocular movements intact.      Conjunctiva/sclera: Conjunctivae normal.   Pulmonary:      Effort: Pulmonary effort is normal.   Skin:     General: Skin is warm and dry.   Neurological:      Mental Status: He is alert and oriented to person, place, and time. Mental status is at baseline.   Psychiatric:         Mood and Affect: Mood normal.         Behavior: Behavior normal.            Admission on 05/12/2025, Discharged on 05/12/2025   Component Date Value Ref Range Status    Ventricular Rate 05/12/2025 70  BPM Final    Atrial Rate 05/12/2025 70  BPM Final    OK Interval 05/12/2025 122  ms Final    QRS Duration 05/12/2025 100  ms Final    QT 05/12/2025 402  ms Final    QTc 05/12/2025 434  ms Final    P Axis 05/12/2025 27  degrees Final    R AXIS 05/12/2025 55  degrees Final    T Axis 05/12/2025 37  degrees Final    Interpretation ECG 05/12/2025    Final                    Value:Sinus rhythm  Normal ECG  No previous ECGs available  Confirmed by SEE ED PROVIDER NOTE FOR, ECG INTERPRETATION ([id number]),  Juan Antonio Iqbal ([id number]) on 5/12/2025 9:08:28 AM      Sodium 05/12/2025 140  135 - 145 mmol/L Final    Potassium 05/12/2025 3.8  3.4 - 5.3 mmol/L Final    Chloride 05/12/2025 107  98 - 107 mmol/L Final    Carbon Dioxide (CO2) 05/12/2025 23  22 - 29 mmol/L Final    Anion Gap 05/12/2025 10  7 - 15 mmol/L Final    Urea Nitrogen 05/12/2025 13.9  6.0 - 20.0 mg/dL  Final    Creatinine 05/12/2025 1.10  0.67 - 1.17 mg/dL Final    GFR Estimate 05/12/2025 81  >60 mL/min/1.73m2 Final    eGFR calculated using 2021 CKD-EPI equation.    Calcium 05/12/2025 9.0  8.8 - 10.4 mg/dL Final    Glucose 05/12/2025 115 (H)  70 - 99 mg/dL Final    Troponin T, High Sensitivity 05/12/2025 7  <=22 ng/L Final    Either a High Sensitivity Troponin T baseline (0 hours) value = 100 ng/L, or an increase in High Sensitivity Troponin T = 7 ng/L at 2 hours compared to 0 hours (2-0 hours), suggests myocardial injury, and urgent clinical attention is required.    If the 2-0 hours increase is <7 ng/L, a High Sensitivity Troponin T result above gender-specific reference ranges warrants further evaluation.   Recommendations for further evaluation include correlation with clinical decision-making tool (e.g., HEART), a 3rd High Sensitivity Troponin T test 2 hours after the 2nd (a 20% change from baseline would represent concern), admission for observation, close PCC/cardiology follow-up, or urgent outpatient provocative testing.    WBC Count 05/12/2025 9.6  4.0 - 11.0 10e3/uL Final    RBC Count 05/12/2025 5.15  4.40 - 5.90 10e6/uL Final    Hemoglobin 05/12/2025 16.1  13.3 - 17.7 g/dL Final    Hematocrit 05/12/2025 46.1  40.0 - 53.0 % Final    MCV 05/12/2025 90  78 - 100 fL Final    MCH 05/12/2025 31.3  26.5 - 33.0 pg Final    MCHC 05/12/2025 34.9  31.5 - 36.5 g/dL Final    RDW 05/12/2025 13.0  10.0 - 15.0 % Final    Platelet Count 05/12/2025 207  150 - 450 10e3/uL Final    % Neutrophils 05/12/2025 75  % Final    % Lymphocytes 05/12/2025 17  % Final    % Monocytes 05/12/2025 5  % Final    % Eosinophils 05/12/2025 1  % Final    % Basophils 05/12/2025 0  % Final    % Immature Granulocytes 05/12/2025 1  % Final    NRBCs per 100 WBC 05/12/2025 0  <1 /100 Final    Absolute Neutrophils 05/12/2025 7.2  1.6 - 8.3 10e3/uL Final    Absolute Lymphocytes 05/12/2025 1.7  0.8 - 5.3 10e3/uL Final    Absolute Monocytes 05/12/2025  0.5  0.0 - 1.3 10e3/uL Final    Absolute Eosinophils 05/12/2025 0.1  0.0 - 0.7 10e3/uL Final    Absolute Basophils 05/12/2025 0.0  0.0 - 0.2 10e3/uL Final    Absolute Immature Granulocytes 05/12/2025 0.1  <=0.4 10e3/uL Final    Absolute NRBCs 05/12/2025 0.0  10e3/uL Final    Hold Specimen 05/12/2025 JIC   Final    Magnesium 05/12/2025 2.2  1.7 - 2.3 mg/dL Final    D-Dimer Quantitative 05/12/2025 <0.27  0.00 - 0.50 ug/mL FEU Final    NT-proBNP 05/12/2025 61  0 - 138 pg/mL Final    Comment: Starting on 05/07/2025, [facility] laboratory began flagging abnormal values for plasma NT-proBNP results for adults using age-specific reference ranges instead of clinical cut-points/thresholds (see interpretation comment below for clinical threshold values) as part of a test standardization effort.     Pediatric abnormal values were already previously flagged using age-specific reference intervals, which are not currently changing. The test methodology remains unchanged, and previous results performed with this methodology can be interpreted with the updated reference intervals.       [facility]'s Pediatric (boys and girls) Reference Ranges in pg/mL *  0 up to 3 days:  0 - 61161  3 days up to 1 month:  0 - 6500  1 month up to 1 year:  0 - 1000  2 up to 6 years:  0 -  330  6 up to 18 years:  0 - 240    Male Reference Ranges in pg/mL  18-44 years:  0 - 93  45-54 years:  0 - 138  55-64 years:  0 - 177  65-74 years:  0 - 229  75 years or older:  0 - 852    Female Reference Ranges in                            pg/mL  8-44 years:  0 - 178  45-54 years:  0 - 192  55-64 years:  0 - 226  65-74 years:  0 - 353  75 years or older:  0 - 624    Reference ranges in adults reflect 95th percentiles for NT-pro-BNP levels in patients without congestive heart failure (CHF). Knowledge of each individual patient's NT-proBNP range may be more useful than using similar cut-points for every patient.    For adult chronic CHF patients according to [place]  [place] Heart Association (NYHA) Functional Class, the mean NT-proBNP concentration is as following (5th and 95th percentile values respectively displayed in parentheses):     Class I: 1016 pg/mL ()  Class II: 1666 pg/mL (103-6567)  Class III: 3029 pg/mL (126-53332)  Class IV: 3465 pg/mL (148-91537)     Clinical thresholds for acute (emergency department) settings:  < 300 pg/mL effectively rules out acute decompensated heart failure (ADHF), with 99% negative predictive value.    The following values may rule in potential acute decompensated heart failure (ADHF)                            in individuals (with a PPV of 50-60%):  Under 50 years:  >450 pg/mL  Between 50-75 years: >900 pg/mL  Over 75 years: >1800 pg/mL    Among patients with dyspnea, NT-proBNP is highly sensitive for detection of acute CHF.  Elevations in NT-proBNP levels may be observed in states other than left ventricular congestive failure including: acute coronary syndromes, right heart strain/failure (including pulmonary embolism and cor pulmonale), critical illness, and renal failure.  Falsely low NT-proBNP in CHF patients may be observed in increased body mass index.  * References: (1) Justino SAHNI et al.  Pediatr Cardiol 30:3-8, 2009.      Group A strep by PCR 05/12/2025 Not Detected  Not Detected Final    Troponin T, High Sensitivity 05/12/2025 6  <=22 ng/L Final    Either a High Sensitivity Troponin T baseline (0 hours) value = 100 ng/L, or an increase in High Sensitivity Troponin T = 7 ng/L at 2 hours compared to 0 hours (2-0 hours), suggests myocardial injury, and urgent clinical attention is required.    If the 2-0 hours increase is <7 ng/L, a High Sensitivity Troponin T result above gender-specific reference ranges warrants further evaluation.   Recommendations for further evaluation include correlation with clinical decision-making tool (e.g., HEART), a 3rd High Sensitivity Troponin T test 2 hours after the 2nd (a 20% change from baseline  would represent concern), admission for observation, close PCC/cardiology follow-up, or urgent outpatient provocative testing.           Signed Electronically by: Christine Medina NP

## 2025-05-15 ENCOUNTER — TELEPHONE (OUTPATIENT)
Dept: GASTROENTEROLOGY | Facility: CLINIC | Age: 50
End: 2025-05-15
Payer: COMMERCIAL

## 2025-05-15 NOTE — TELEPHONE ENCOUNTER
"Endoscopy Scheduling Screen    Caller: patient    Have you had any respiratory illness or flu-like symptoms in the last 10 days?  No    What is your communication preference for Instructions and/or Bowel Prep?   Terat    What insurance is in the chart?  Other:  [facility]    Ordering/Referring Provider: GRACIE VERDUZCO   (If ordering provider performs procedure, schedule with ordering provider unless otherwise instructed. )    BMI: Estimated body mass index is 31.44 kg/m  as calculated from the following:    Height as of 5/12/25: 1.803 m (5' 11\").    Weight as of 5/13/25: 102.2 kg (225 lb 6.4 oz).     Sedation Ordered  moderate sedation.   If patient BMI > 50 do not schedule in [facility].    If patient BMI > 45 do not schedule at [facility].    Are you taking methadone or Suboxone?  NO, No RN review required.    Have you been diagnosed and are being treated for severe PTSD or severe anxiety?  NO, No RN review required.    Are you taking any prescription medications for pain 3 or more times per week?   NO, No RN review required.    Do you have a history of malignant hyperthermia?  No    (Females) Are you currently pregnant?   No     Have you been diagnosed or told you have pulmonary hypertension?   No    Do you have an LVAD?  No    Have you been told you have moderate to severe sleep apnea?  No.    Have you been told you have COPD, asthma, or any other lung disease?  No    Has your doctor ordered any cardiac tests like echo, angiogram, stress test, ablation, or EKG, that you have not completed yet?  No    Do you  have a history of any heart conditions?  No     Have you ever had or are you waiting for an organ transplant?  No. Continue scheduling, no site restrictions.    Have you had a stroke or transient ischemic attack (TIA aka \"mini stroke\") in the last 2 years?   No.    Have you been diagnosed with or been told you have cirrhosis of the liver?   No.    Are you currently on dialysis?   No    Do you need assistance " "transferring?   No    BMI: Estimated body mass index is 31.44 kg/m  as calculated from the following:    Height as of 5/12/25: 1.803 m (5' 11\").    Weight as of 5/13/25: 102.2 kg (225 lb 6.4 oz).     Is patients BMI > 40 and scheduling location [facility]?  No    Do you take an injectable or oral medication for weight loss or diabetes (excluding insulin)?  No    Do you take the medication Naltrexone?  No    Do you take blood thinners?  No       Prep   Are you currently on dialysis or do you have chronic kidney disease?  No    Do you have a diagnosis of diabetes?  No    Do you have a diagnosis of cystic fibrosis (CF)?  No    On a regular basis do you go 3 -5 days between bowel movements?  No    BMI > 40?  No    Preferred Pharmacy:    [facility] - [place] - [address] [place] [address]  [address] [place] [address] [place] [address]  Phone: [phone] Fax: [phone]    Final Scheduling Details     Procedure scheduled  Colonoscopy / Upper endoscopy (EGD)    Surgeon:  FATIMAH     Date of procedure:  6/2/25     Pre-OP / PAC:   No - Not required for this site.    Location  [facility] - Per order.    Sedation   MAC/Deep Sedation Per location.      Patient Reminders:   You will receive a call from a Nurse to review instructions and health history.  This assessment must be completed prior to your procedure.  Failure to complete the Nurse assessment may result in the procedure being cancelled.      On the day of your procedure, please designate an adult(s) who can drive you home stay with you for the next 24 hours. The medicines used in the exam will make you sleepy. You will not be able to drive.      You cannot take public transportation, ride share services, or non-medical taxi service without a responsible caregiver.  Medical transport services are allowed with the requirement that a responsible caregiver will receive you at your destination.  We require that drivers and caregivers are confirmed " prior to your procedure.

## 2025-07-13 ENCOUNTER — HEALTH MAINTENANCE LETTER (OUTPATIENT)
Age: 50
End: 2025-07-13

## 2025-07-27 NOTE — COMMUNITY RESOURCES LIST (ENGLISH)
Food  Food Helpline   Program Provider: [employer]  Program Website : https://www.hungersolutions.org/programs/[place]-food-helpline/  Next Steps: get more info https://www.hungersolutions.org/programs/[place]-food-helpline/how-can-we-help-you/    Program Locations:   Address:  [address] [place] [address]   Distance:  25.11 mi     Hours:   Monday: 8:00 AM - 5:00 PM   Tuesday: 8:00 AM - 5:00 PM   Wednesday: 8:00 AM - 5:00 PM   Thursday: 8:00 AM - 5:00 PM   Friday: 8:00 AM - 5:00 PM   Saturday: CLOSED   Sunday: CLOSED     Reduced Cost Groceries   Program Provider: [employer]  Program Website : [email or web address]://[employer].org/groceries/fare-for-all/  Next Steps: Go to [email or web address]/groceries/fare-for-all/schedule/    Program Locations:   Address:  [address] [place] [address]   Distance:  25.11 mi     Hours:   Monday: 9:00 AM - 5:00 PM   Tuesday: 9:00 AM - 5:00 PM   Wednesday: 9:00 AM - 5:00 PM   Thursday: 9:00 AM - 5:00 PM   Friday: 9:00 AM - 5:00 PM   Saturday: CLOSED   Sunday: CLOSED     Supplemental Nutrition Assistance Program (SNAP) Outreach   Program Provider: [facility]  Program Website : https://www.[employer].org/who--how-we-help/services-and-programs/programs/snap-outreach.html#.DyghLMTZl9d  Program Phone Number: [phone]  Next Steps: Go to https://www.[employer].org/who--how-we-help/services-and-programs/programs/snap-outreach.html#.ExfmPVPXa9i    Program Locations:   Address:  [address] [place] [address]   Distance:  32.1 mi   Office Phone Number: [phone]    Hours:   Monday: 8:00 AM - 4:30 PM   Tuesday: 8:00 AM - 4:30 PM   Wednesday: 8:00 AM - 4:30 PM   Thursday: 8:00 AM - 4:30 PM   Friday: 8:00 AM - 4:30 PM   Saturday: CLOSED   Sunday: CLOSED     Utilities  Bill-Pay Assistance   Program Provider: [facility]  Program Website :  https://[facility].org/[facility]/overcome-poverty/  Program Phone Number: [phone]  Next Steps: get more info [email or web address]/[facility]/contact-us/    Program Locations:   Address:  [address] [place] [address]   Distance:  29.3 mi   Office Phone Number: [phone]    Hours:   Monday: 8:00 AM - 5:00 PM   Tuesday: 8:00 AM - 5:00 PM   Wednesday: 8:00 AM - 5:00 PM   Thursday: 8:00 AM - 5:00 PM   Friday: 8:00 AM - 5:00 PM   Saturday: CLOSED   Sunday: CLOSED     Health Care Coverage Enrollment Assistance   Program Provider: [employer]  Program Website : [email or web address]/[email or web address]  Next Steps: Call [phone]    Program Locations:   Address:  [address] [place] [address]   Distance:  33.39 mi   Office Phone Number: [phone]    Hours:   Monday: 8:00 AM - 4:30 PM   Tuesday: 8:00 AM - 4:30 PM   Wednesday: 8:00 AM - 4:30 PM   Thursday: 8:00 AM - 4:30 PM   Friday: 8:00 AM - 4:30 PM   Saturday: CLOSED   Sunday: CLOSED     Energy Assistance Program (EAP)   Program Provider: [place] [employer]  Program Website : https://mn.[employer]/consumers/consumer-assistance/energy-assistance/  Program Phone Number: [phone]  Next Steps: Go to https://mn.[employer]/consumers/consumer-assistance/energy-assistance/    Program Locations:   Address:  [address] [place] [address]   Distance:  35.11 mi   Office Phone Number: [phone]    Hours:   Monday: 8:00 AM - 5:00 PM   Tuesday: 8:00 AM - 5:00 PM   Wednesday: 8:00 AM - 5:00 PM   Thursday: 8:00 AM - 5:00 PM   Friday: 8:00 AM - 5:00 PM   Saturday: CLOSED   Sunday: CLOSED

## 2025-07-28 ENCOUNTER — OFFICE VISIT (OUTPATIENT)
Dept: FAMILY MEDICINE | Facility: OTHER | Age: 50
End: 2025-07-28
Payer: COMMERCIAL

## 2025-07-28 VITALS
HEART RATE: 72 BPM | WEIGHT: 227 LBS | TEMPERATURE: 97.4 F | RESPIRATION RATE: 18 BRPM | OXYGEN SATURATION: 96 % | SYSTOLIC BLOOD PRESSURE: 94 MMHG | HEIGHT: 70 IN | DIASTOLIC BLOOD PRESSURE: 76 MMHG | BODY MASS INDEX: 32.5 KG/M2

## 2025-07-28 DIAGNOSIS — L57.0 AK (ACTINIC KERATOSIS): ICD-10-CM

## 2025-07-28 DIAGNOSIS — Z11.59 NEED FOR HEPATITIS C SCREENING TEST: ICD-10-CM

## 2025-07-28 DIAGNOSIS — Z13.9 ENCOUNTER FOR SCREENING INVOLVING SOCIAL DETERMINANTS OF HEALTH (SDOH): ICD-10-CM

## 2025-07-28 DIAGNOSIS — E66.811 OBESITY, CLASS I, BMI 30-34.9: ICD-10-CM

## 2025-07-28 DIAGNOSIS — Z13.6 SCREENING FOR CARDIOVASCULAR CONDITION: ICD-10-CM

## 2025-07-28 DIAGNOSIS — Z12.5 SCREENING FOR PROSTATE CANCER: ICD-10-CM

## 2025-07-28 DIAGNOSIS — Z00.00 ROUTINE HISTORY AND PHYSICAL EXAMINATION OF ADULT: Primary | ICD-10-CM

## 2025-07-28 DIAGNOSIS — D17.30 LIPOMA OF SKIN AND SUBCUTANEOUS TISSUE: ICD-10-CM

## 2025-07-28 DIAGNOSIS — H90.3 SENSORINEURAL HEARING LOSS, BILATERAL: ICD-10-CM

## 2025-07-28 DIAGNOSIS — Z12.11 SCREEN FOR COLON CANCER: ICD-10-CM

## 2025-07-28 DIAGNOSIS — R13.19 OTHER DYSPHAGIA: ICD-10-CM

## 2025-07-28 DIAGNOSIS — E78.5 HYPERLIPIDEMIA LDL GOAL <130: ICD-10-CM

## 2025-07-28 PROCEDURE — 99396 PREV VISIT EST AGE 40-64: CPT | Performed by: PHYSICIAN ASSISTANT

## 2025-07-28 PROCEDURE — 99213 OFFICE O/P EST LOW 20 MIN: CPT | Mod: 25 | Performed by: PHYSICIAN ASSISTANT

## 2025-07-28 RX ORDER — OMEPRAZOLE 40 MG/1
40 CAPSULE, DELAYED RELEASE ORAL DAILY
Qty: 90 CAPSULE | Refills: 1 | Status: SHIPPED | OUTPATIENT
Start: 2025-07-28

## 2025-07-28 ASSESSMENT — PAIN SCALES - GENERAL: PAINLEVEL_OUTOF10: MILD PAIN (3)

## 2025-07-28 NOTE — PROGRESS NOTES
Preventive Care Visit  [facility]  Miguelito Alvarado PA-C, Family Medicine  Jul 28, 2025      Assessment & Plan     Routine history and physical examination of adult  49-year-old male here for routine physical.  Related labs pending.  Follow-up based on results.  - CBC with platelets; Future  - Comprehensive metabolic panel (BMP + Alb, Alk Phos, ALT, AST, Total. Bili, TP); Future  - Lipid panel reflex to direct LDL Fasting; Future    Encounter for screening involving social determinants of health (SDoH)  Due to his poor hearing ability I would ask that care coordination be involved to make sure that he does not miss appointments at [facility].  - Primary Care - Care Coordination Referral; Future    AK (actinic keratosis)  Multiple benign-appearing actinic keratotic type of lesions mostly to the chest and back.  Multiple skin tags noted these appear to be benign as well.  Follow-up as needed.    Hyperlipidemia LDL goal <130  LDL goal based on age.  Labs pending.  Follow-up based on results.    Sensorineural hearing loss, bilateral  Reads lips very well.  Follow-up as needed.  Would encourage him to get hearing aids if at all possible.    Obesity, Class I, BMI 30-34.9  Body mass index today 32.3.  Increase physical exercise and dietary changes strongly recommended.    Lipoma of skin and subcutaneous tissue  Most notably to the left cheek.  He declines any further workup for this.  This has been present for quite some time.  He does have 1 to his left posterior parietal scalp.    Other dysphagia  Certainly could be silent heartburn.  Trial of medications while we wait for him to get into see GI for upper GI endoscopy.  - omeprazole (PRILOSEC) 40 MG DR capsule; Take 1 capsule (40 mg) by mouth daily. Take 30-60 minutes before a meal.    Screen for colon cancer  Need for hepatitis C screening test  Screening for cardiovascular condition  Screening for prostate cancer  Screening due as noted above.  - PSA,  "screen; Future    BMI  Estimated body mass index is 32.32 kg/m  as calculated from the following:    Height as of this encounter: 1.785 m (5' 10.28\").    Weight as of this encounter: 103 kg (227 lb).   Weight management plan: Discussed healthy diet and exercise guidelines  Reviewed preventive health counseling, as reflected in patient instructions       Regular exercise       Healthy diet/nutrition       Vision screening       Hearing screening       Alcohol Use        Safe sex practices/STD prevention       Hep C screening for all patients one time for ages 18-79 years       HIV screeninx in teen years, 1x in adult years, and at intervals if high risk       Colorectal cancer screening       Consider prostate cancer screening (age 55-69)    Carolina Sethi is a 49 year old, presenting for the following:  Establish Care (Referral procedures) and Physical        2025     1:07 PM   Additional Questions   Roomed by Suze   Accompanied by Self          Healthy Habits:     Getting at least 3 servings of Calcium per day:  Yes    Bi-annual eye exam:  NO    Dental care twice a year:  NO    Sleep apnea or symptoms of sleep apnea:  None    Diet:  Regular (no restrictions)    Frequency of exercise:  2-3 days/week    Duration of exercise:  15-30 minutes    Taking medications regularly:  0    Barriers to taking medications:  Not applicable    Medication side effects:  Not applicable    Additional concerns today:  Yes (spot on head/skin tags)  History of Present Illness       Reason for visit:  I need a primary Dr   He is taking medications regularly.  He is not taking prescribed medications regularly due to Not applicable.    Needs referral to get Endoscope and Colonoscopy.  Had CT done through ED for issues with swallowing food. Records in chart.     Advance Care Planning    Advance care planning document is on file but is outdated.  Patient encouraged to update or provider to update POLST.         No data to display "                   No data to display                   No data to display                  7/27/2025   Social Factors   Worry food won't last until get money to buy more Yes   Food not last or not have enough money for food? Yes   Do you have housing? (Housing is defined as stable permanent housing and does not include staying outside in a car, in a tent, in an abandoned building, in an overnight shelter, or couch-surfing.) Yes   Are you worried about losing your housing? No   Lack of transportation? No   Unable to get utilities (heat,electricity)? Yes   Want help with housing or utility concern? (!) YES   (!) FOOD SECURITY CONCERN PRESENT(!) FINANCIAL RESOURCE STRAIN CONCERN       No data to display                    Today's PHQ-2 Score:       7/28/2025     1:24 PM   PHQ-2 ( 1999 [employer])   Q1: Little interest or pleasure in doing things 0   Q2: Feeling down, depressed or hopeless 0   PHQ-2 Score 0         5/15/2025   Substance Use   If I could quit smoking, I would Completely agree   I want to quit somking, worry about health affects Completely agree   Willing to make a plan to quit smoking Completely agree   Willing to cut down before quitting Completely agree     Social History     Tobacco Use    Smoking status: Every Day     Current packs/day: 0.50     Average packs/day: 0.5 packs/day for 53.6 years (26.8 ttl pk-yrs)     Types: Cigarettes     Start date: 1/1/1994    Smokeless tobacco: Never   Vaping Use    Vaping status: Some Days    Substances: Nicotine, THC, Flavoring   Substance Use Topics    Alcohol use: Yes    Drug use: Yes     Types: Marijuana     Comment: weed and cigarettes.. maybe a few beers a month       ASCVD Risk   The ASCVD Risk score (López CAMPOS, et al., 2019) failed to calculate for the following reasons:    Cannot find a previous HDL lab    Cannot find a previous total cholesterol lab       Reviewed and updated as needed this visit by Provider                    Past Medical History:    Diagnosis Date    Hyperlipidemia LDL goal <130 07/28/2025    Other dysphagia 07/28/2025     Past Surgical History:   Procedure Laterality Date    ORTHOPEDIC SURGERY      OTHER SURGICAL HISTORY      no prior surgeries     Lab work is in process  Labs reviewed in EPIC  BP Readings from Last 3 Encounters:   07/28/25 94/76   05/13/25 90/60   05/12/25 110/85    Wt Readings from Last 3 Encounters:   07/28/25 103 kg (227 lb)   05/13/25 102.2 kg (225 lb 6.4 oz)   05/12/25 102.4 kg (225 lb 12.8 oz)                  Patient Active Problem List   Diagnosis    CARDIOVASCULAR SCREENING; LDL GOAL LESS THAN 160    Obesity, Class I, BMI 30-34.9    Sensorineural hearing loss, bilateral    Family history of cancer    Other dysphagia    Hyperlipidemia LDL goal <130    AK (actinic keratosis)    Lipoma of skin and subcutaneous tissue     Past Surgical History:   Procedure Laterality Date    ORTHOPEDIC SURGERY      OTHER SURGICAL HISTORY      no prior surgeries       Social History     Tobacco Use    Smoking status: Every Day     Current packs/day: 0.50     Average packs/day: 0.5 packs/day for 53.6 years (26.8 ttl pk-yrs)     Types: Cigarettes     Start date: 1/1/1994    Smokeless tobacco: Never   Substance Use Topics    Alcohol use: Yes     Family History   Problem Relation Age of Onset    Cancer Mother         liver cancer and skin cancer    Liver Cancer Mother         s/p liver transplant    Other Cancer Mother     Hypertension Maternal Grandfather     Hypertension Maternal Uncle     Alcoholism Other     Asthma No family hx of     C.A.D. No family hx of     Diabetes No family hx of     Cerebrovascular Disease No family hx of     Breast Cancer No family hx of     Cancer - colorectal No family hx of          Current Outpatient Medications   Medication Sig Dispense Refill    omeprazole (PRILOSEC) 40 MG DR capsule Take 1 capsule (40 mg) by mouth daily. Take 30-60 minutes before a meal. 90 capsule 1     Allergies   Allergen Reactions     "Nickel Other (See Comments)     convulsions    Imitrex [Sumatriptan]      Eye pain, stomach pain, headache     Recent Labs   Lab Test 05/12/25  0858 12/11/23  1219   ALT  --  28   CR 1.10 1.23*   GFRESTIMATED 81 72   POTASSIUM 3.8 4.1               Review of Systems  Constitutional, HEENT, cardiovascular, pulmonary, GI, , musculoskeletal, neuro, skin, endocrine and psych systems are negative, except as otherwise noted.     Objective    Exam  BP 94/76   Pulse 72   Temp 97.4  F (36.3  C) (Temporal)   Resp 18   Ht 1.785 m (5' 10.28\")   Wt 103 kg (227 lb)   SpO2 96%   BMI 32.32 kg/m     Estimated body mass index is 32.32 kg/m  as calculated from the following:    Height as of this encounter: 1.785 m (5' 10.28\").    Weight as of this encounter: 103 kg (227 lb).    Physical Exam  GENERAL: alert and no distress  EYES: Eyes grossly normal to inspection, PERRL and conjunctivae and sclerae normal  HENT: ear canals and TM's normal, nose and mouth without ulcers or lesions  NECK: no adenopathy, no asymmetry, masses, or scars  RESP: lungs clear to auscultation - no rales, rhonchi or wheezes  CV: regular rate and rhythm, normal S1 S2, no S3 or S4, no murmur, click or rub, no peripheral edema  ABDOMEN: soft, nontender, no hepatosplenomegaly, no masses and bowel sounds normal  MS: no gross musculoskeletal defects noted, no edema  SKIN: no suspicious lesions or rashes to exposed visible skin.  What appears to be multiple actinic keratotic lesions as well as skin tags noted to the upper body.  He has too many tattoos to count.  He has what appears to be a lipoma or maybe even a sebaceous cyst to the left cheek that has been present for quite some time and stable in size by report.  NEURO: Normal strength and tone, mentation intact and speech normal  PSYCH: mentation appears normal, affect normal/bright        Signed Electronically by: Miguelito Alvarado PA-C    "

## 2025-07-29 ENCOUNTER — PATIENT OUTREACH (OUTPATIENT)
Dept: CARE COORDINATION | Facility: CLINIC | Age: 50
End: 2025-07-29
Payer: COMMERCIAL

## 2025-07-29 DIAGNOSIS — Z71.89 OTHER SPECIFIED COUNSELING: Primary | Chronic | ICD-10-CM

## 2025-08-05 ENCOUNTER — PATIENT OUTREACH (OUTPATIENT)
Dept: CARE COORDINATION | Facility: CLINIC | Age: 50
End: 2025-08-05
Payer: COMMERCIAL

## 2025-08-14 ENCOUNTER — TELEPHONE (OUTPATIENT)
Dept: GASTROENTEROLOGY | Facility: CLINIC | Age: 50
End: 2025-08-14
Payer: COMMERCIAL

## 2025-08-26 ENCOUNTER — TELEPHONE (OUTPATIENT)
Dept: GASTROENTEROLOGY | Facility: CLINIC | Age: 50
End: 2025-08-26
Payer: COMMERCIAL

## 2025-09-03 ENCOUNTER — ANESTHESIA EVENT (OUTPATIENT)
Dept: GASTROENTEROLOGY | Facility: CLINIC | Age: 50
End: 2025-09-03
Payer: COMMERCIAL

## 2025-09-03 ENCOUNTER — ANESTHESIA (OUTPATIENT)
Dept: GASTROENTEROLOGY | Facility: CLINIC | Age: 50
End: 2025-09-03
Payer: COMMERCIAL

## 2025-09-03 ENCOUNTER — HOSPITAL ENCOUNTER (OUTPATIENT)
Facility: CLINIC | Age: 50
Discharge: HOME | End: 2025-09-03
Attending: INTERNAL MEDICINE | Admitting: INTERNAL MEDICINE
Payer: COMMERCIAL

## 2025-09-03 VITALS
HEART RATE: 56 BPM | RESPIRATION RATE: 16 BRPM | TEMPERATURE: 98 F | OXYGEN SATURATION: 98 % | SYSTOLIC BLOOD PRESSURE: 112 MMHG | DIASTOLIC BLOOD PRESSURE: 84 MMHG

## 2025-09-03 LAB — COLONOSCOPY: NORMAL

## 2025-09-03 PROCEDURE — 45385 COLONOSCOPY W/LESION REMOVAL: CPT | Performed by: INTERNAL MEDICINE

## 2025-09-03 PROCEDURE — 370N000017 HC ANESTHESIA TECHNICAL FEE, PER MIN: Performed by: INTERNAL MEDICINE

## 2025-09-03 PROCEDURE — 45380 COLONOSCOPY AND BIOPSY: CPT | Performed by: INTERNAL MEDICINE

## 2025-09-03 PROCEDURE — 258N000003 HC RX IP 258 OP 636: Performed by: NURSE ANESTHETIST, CERTIFIED REGISTERED

## 2025-09-03 PROCEDURE — 250N000011 HC RX IP 250 OP 636

## 2025-09-03 RX ORDER — SODIUM CHLORIDE, SODIUM LACTATE, POTASSIUM CHLORIDE, CALCIUM CHLORIDE 600; 310; 30; 20 MG/100ML; MG/100ML; MG/100ML; MG/100ML
INJECTION, SOLUTION INTRAVENOUS CONTINUOUS
Status: DISCONTINUED | OUTPATIENT
Start: 2025-09-03 | End: 2025-09-03 | Stop reason: HOSPADM

## 2025-09-03 RX ORDER — NALOXONE HYDROCHLORIDE 0.4 MG/ML
0.1 INJECTION, SOLUTION INTRAMUSCULAR; INTRAVENOUS; SUBCUTANEOUS
Status: CANCELLED | OUTPATIENT
Start: 2025-09-03

## 2025-09-03 RX ORDER — PROPOFOL 10 MG/ML
INJECTION, EMULSION INTRAVENOUS PRN
Status: DISCONTINUED | OUTPATIENT
Start: 2025-09-03 | End: 2025-09-03

## 2025-09-03 RX ORDER — LIDOCAINE 40 MG/G
CREAM TOPICAL
Status: DISCONTINUED | OUTPATIENT
Start: 2025-09-03 | End: 2025-09-03 | Stop reason: HOSPADM

## 2025-09-03 RX ORDER — DEXAMETHASONE SODIUM PHOSPHATE 10 MG/ML
4 INJECTION, SOLUTION INTRAMUSCULAR; INTRAVENOUS
Status: CANCELLED | OUTPATIENT
Start: 2025-09-03

## 2025-09-03 RX ORDER — PROPOFOL 10 MG/ML
INJECTION, EMULSION INTRAVENOUS CONTINUOUS PRN
Status: DISCONTINUED | OUTPATIENT
Start: 2025-09-03 | End: 2025-09-03

## 2025-09-03 RX ORDER — ONDANSETRON 4 MG/1
4 TABLET, ORALLY DISINTEGRATING ORAL EVERY 30 MIN PRN
Status: CANCELLED | OUTPATIENT
Start: 2025-09-03

## 2025-09-03 RX ORDER — ONDANSETRON 2 MG/ML
4 INJECTION INTRAMUSCULAR; INTRAVENOUS EVERY 30 MIN PRN
Status: CANCELLED | OUTPATIENT
Start: 2025-09-03

## 2025-09-03 RX ADMIN — PROPOFOL 100 MG: 10 INJECTION, EMULSION INTRAVENOUS at 10:41

## 2025-09-03 RX ADMIN — SODIUM CHLORIDE, SODIUM LACTATE, POTASSIUM CHLORIDE, AND CALCIUM CHLORIDE: .6; .31; .03; .02 INJECTION, SOLUTION INTRAVENOUS at 09:46

## 2025-09-03 RX ADMIN — PROPOFOL 150 MCG/KG/MIN: 10 INJECTION, EMULSION INTRAVENOUS at 10:41

## 2025-09-03 ASSESSMENT — ACTIVITIES OF DAILY LIVING (ADL)
ADLS_ACUITY_SCORE: 41
ADLS_ACUITY_SCORE: 41

## 2025-09-03 ASSESSMENT — LIFESTYLE VARIABLES: TOBACCO_USE: 1

## (undated) DEVICE — SUCTION MANIFOLD NEPTUNE 2 SYS 1 PORT 702-025-000

## (undated) DEVICE — ENDO TRAP POLYP E-TRAP 00711099

## (undated) DEVICE — SNARE LARIAT MULTI OVAL- 55X30MM HEX- 28X10MM DMD- 15X6MM

## (undated) DEVICE — SOLUTION WATER 1000ML BOTTLE R5000-01

## (undated) DEVICE — KIT ENDO TURNOVER/PROCEDURE CARRY-ON 101822

## (undated) DEVICE — ENDO FORCEP ENDOJAW BIOPSY 2.8MMX230CM FB-220U

## (undated) DEVICE — SNARE CAPIVATOR ROUND COLD SNR BX10 M00561101

## (undated) DEVICE — TUBING SUCTION 6"X3/16" N56A